# Patient Record
Sex: MALE | Race: BLACK OR AFRICAN AMERICAN | NOT HISPANIC OR LATINO | URBAN - METROPOLITAN AREA
[De-identification: names, ages, dates, MRNs, and addresses within clinical notes are randomized per-mention and may not be internally consistent; named-entity substitution may affect disease eponyms.]

---

## 2021-04-12 ENCOUNTER — INPATIENT (INPATIENT)
Facility: HOSPITAL | Age: 36
LOS: 1 days | Discharge: HOME | DRG: 418 | End: 2021-04-14
Attending: SURGERY | Admitting: SURGERY
Payer: COMMERCIAL

## 2021-04-12 ENCOUNTER — TRANSCRIPTION ENCOUNTER (OUTPATIENT)
Age: 36
End: 2021-04-12

## 2021-04-12 VITALS
HEART RATE: 78 BPM | SYSTOLIC BLOOD PRESSURE: 151 MMHG | DIASTOLIC BLOOD PRESSURE: 83 MMHG | HEIGHT: 72 IN | OXYGEN SATURATION: 99 % | TEMPERATURE: 98 F | WEIGHT: 315 LBS | RESPIRATION RATE: 18 BRPM

## 2021-04-12 LAB
ALBUMIN SERPL ELPH-MCNC: 3.4 G/DL — SIGNIFICANT CHANGE UP (ref 3.3–5)
ALP SERPL-CCNC: 78 U/L — SIGNIFICANT CHANGE UP (ref 40–120)
ALT FLD-CCNC: 44 U/L — SIGNIFICANT CHANGE UP (ref 12–78)
ANION GAP SERPL CALC-SCNC: 8 MMOL/L — SIGNIFICANT CHANGE UP (ref 5–17)
AST SERPL-CCNC: 21 U/L — SIGNIFICANT CHANGE UP (ref 15–37)
BASOPHILS # BLD AUTO: 0.03 K/UL — SIGNIFICANT CHANGE UP (ref 0–0.2)
BASOPHILS NFR BLD AUTO: 0.4 % — SIGNIFICANT CHANGE UP (ref 0–2)
BILIRUB SERPL-MCNC: 0.4 MG/DL — SIGNIFICANT CHANGE UP (ref 0.2–1.2)
BUN SERPL-MCNC: 13 MG/DL — SIGNIFICANT CHANGE UP (ref 7–23)
CALCIUM SERPL-MCNC: 8.7 MG/DL — SIGNIFICANT CHANGE UP (ref 8.5–10.1)
CHLORIDE SERPL-SCNC: 105 MMOL/L — SIGNIFICANT CHANGE UP (ref 96–108)
CO2 SERPL-SCNC: 23 MMOL/L — SIGNIFICANT CHANGE UP (ref 22–31)
CREAT SERPL-MCNC: 0.91 MG/DL — SIGNIFICANT CHANGE UP (ref 0.5–1.3)
EOSINOPHIL # BLD AUTO: 0.01 K/UL — SIGNIFICANT CHANGE UP (ref 0–0.5)
EOSINOPHIL NFR BLD AUTO: 0.1 % — SIGNIFICANT CHANGE UP (ref 0–6)
GLUCOSE SERPL-MCNC: 120 MG/DL — HIGH (ref 70–99)
HCT VFR BLD CALC: 47.5 % — SIGNIFICANT CHANGE UP (ref 39–50)
HGB BLD-MCNC: 15.3 G/DL — SIGNIFICANT CHANGE UP (ref 13–17)
IMM GRANULOCYTES NFR BLD AUTO: 0.5 % — SIGNIFICANT CHANGE UP (ref 0–1.5)
LIDOCAIN IGE QN: 71 U/L — LOW (ref 73–393)
LYMPHOCYTES # BLD AUTO: 1.09 K/UL — SIGNIFICANT CHANGE UP (ref 1–3.3)
LYMPHOCYTES # BLD AUTO: 14.2 % — SIGNIFICANT CHANGE UP (ref 13–44)
MCHC RBC-ENTMCNC: 27.1 PG — SIGNIFICANT CHANGE UP (ref 27–34)
MCHC RBC-ENTMCNC: 32.2 GM/DL — SIGNIFICANT CHANGE UP (ref 32–36)
MCV RBC AUTO: 84.1 FL — SIGNIFICANT CHANGE UP (ref 80–100)
MONOCYTES # BLD AUTO: 0.44 K/UL — SIGNIFICANT CHANGE UP (ref 0–0.9)
MONOCYTES NFR BLD AUTO: 5.7 % — SIGNIFICANT CHANGE UP (ref 2–14)
NEUTROPHILS # BLD AUTO: 6.07 K/UL — SIGNIFICANT CHANGE UP (ref 1.8–7.4)
NEUTROPHILS NFR BLD AUTO: 79.1 % — HIGH (ref 43–77)
NRBC # BLD: 0 /100 WBCS — SIGNIFICANT CHANGE UP (ref 0–0)
PLATELET # BLD AUTO: 284 K/UL — SIGNIFICANT CHANGE UP (ref 150–400)
POTASSIUM SERPL-MCNC: 4.4 MMOL/L — SIGNIFICANT CHANGE UP (ref 3.5–5.3)
POTASSIUM SERPL-SCNC: 4.4 MMOL/L — SIGNIFICANT CHANGE UP (ref 3.5–5.3)
PROT SERPL-MCNC: 8.5 G/DL — HIGH (ref 6–8.3)
RBC # BLD: 5.65 M/UL — SIGNIFICANT CHANGE UP (ref 4.2–5.8)
RBC # FLD: 13.9 % — SIGNIFICANT CHANGE UP (ref 10.3–14.5)
SODIUM SERPL-SCNC: 136 MMOL/L — SIGNIFICANT CHANGE UP (ref 135–145)
WBC # BLD: 7.68 K/UL — SIGNIFICANT CHANGE UP (ref 3.8–10.5)
WBC # FLD AUTO: 7.68 K/UL — SIGNIFICANT CHANGE UP (ref 3.8–10.5)

## 2021-04-12 PROCEDURE — 99285 EMERGENCY DEPT VISIT HI MDM: CPT

## 2021-04-12 PROCEDURE — 93010 ELECTROCARDIOGRAM REPORT: CPT

## 2021-04-12 PROCEDURE — 76700 US EXAM ABDOM COMPLETE: CPT | Mod: 26

## 2021-04-12 RX ORDER — ONDANSETRON 8 MG/1
4 TABLET, FILM COATED ORAL ONCE
Refills: 0 | Status: COMPLETED | OUTPATIENT
Start: 2021-04-12 | End: 2021-04-12

## 2021-04-12 RX ORDER — LIDOCAINE 4 G/100G
10 CREAM TOPICAL ONCE
Refills: 0 | Status: COMPLETED | OUTPATIENT
Start: 2021-04-12 | End: 2021-04-12

## 2021-04-12 RX ORDER — MORPHINE SULFATE 50 MG/1
4 CAPSULE, EXTENDED RELEASE ORAL ONCE
Refills: 0 | Status: DISCONTINUED | OUTPATIENT
Start: 2021-04-12 | End: 2021-04-12

## 2021-04-12 RX ORDER — PANTOPRAZOLE SODIUM 20 MG/1
40 TABLET, DELAYED RELEASE ORAL ONCE
Refills: 0 | Status: COMPLETED | OUTPATIENT
Start: 2021-04-12 | End: 2021-04-12

## 2021-04-12 RX ORDER — SODIUM CHLORIDE 9 MG/ML
1000 INJECTION INTRAMUSCULAR; INTRAVENOUS; SUBCUTANEOUS ONCE
Refills: 0 | Status: COMPLETED | OUTPATIENT
Start: 2021-04-12 | End: 2021-04-12

## 2021-04-12 RX ADMIN — MORPHINE SULFATE 4 MILLIGRAM(S): 50 CAPSULE, EXTENDED RELEASE ORAL at 23:09

## 2021-04-12 RX ADMIN — SODIUM CHLORIDE 1000 MILLILITER(S): 9 INJECTION INTRAMUSCULAR; INTRAVENOUS; SUBCUTANEOUS at 22:47

## 2021-04-12 RX ADMIN — LIDOCAINE 10 MILLILITER(S): 4 CREAM TOPICAL at 22:46

## 2021-04-12 RX ADMIN — PANTOPRAZOLE SODIUM 40 MILLIGRAM(S): 20 TABLET, DELAYED RELEASE ORAL at 22:46

## 2021-04-12 RX ADMIN — Medication 30 MILLILITER(S): at 22:46

## 2021-04-12 RX ADMIN — ONDANSETRON 4 MILLIGRAM(S): 8 TABLET, FILM COATED ORAL at 22:46

## 2021-04-12 NOTE — ED PROVIDER NOTE - ATTENDING CONTRIBUTION TO CARE
36 yo male no pmhx c/o epigastric/RUQ abdominal since 1pm today, sharp, constant, nonradiating, moderate, had this type of pain in the past, +n/v.  No diarrhea.  No fever/chills. No flank pain.      Gen: Alert, NAD, obese  Head/eyes: NC/AT, PERRL  ENT: airway patent  Neck: supple, no tenderness/meningismus/JVD, Trachea midline  Pulm/lung: Bilateral clear BS, normal resp effort, no wheeze/stridor/retractions  CV/heart: RRR, no M/R/G, +2 dist pulses (radial, pedal DP/PT, popliteal)  GI/Abd: soft, +ttp epigastric and RUQ/ND, +BS, no guarding/rebound tenderness  Musculoskeletal: no edema/erythema/cyanosis, FROM in all extremities, no C/T/L spine ttp  Skin: no rash, no vesicles, no petechaie, no ecchymosis, no swelling  Neuro: AAOx3, CN 2-12 intact, normal sensation, 5/5 motor strength in all extremities, normal gait    labs, US, IV analgesia, antiemetics

## 2021-04-12 NOTE — ED PROVIDER NOTE - OBJECTIVE STATEMENT
36 yo male with no significant pmh presents to ED c/o epigastric/RUQ abd pain since 1 pm this afternoon. Pain is sharp, constant, nonradiating. Patient admits to previous similar pain however pain resolved spontaneously and was never evaluated for it. Associated with nausea and vomiting. Denies fever, chills, chest pain, diarrhea, urinary sxs, flank pain. no previous abd surgery.

## 2021-04-12 NOTE — ED ADULT TRIAGE NOTE - CHIEF COMPLAINT QUOTE
35 yr old male c/o epigastric abdominal pain  with NV since this afternoon. Took tylenol and hydrocodone without relief.

## 2021-04-12 NOTE — ED PROVIDER NOTE - PROGRESS NOTE DETAILS
discussed case with [facility] surgical PA, recommend hold in ED until AM for HIDA, Dr. Chaudhary will see in AM Dr. Chaudhary will admit

## 2021-04-12 NOTE — ED PROVIDER NOTE - CLINICAL SUMMARY MEDICAL DECISION MAKING FREE TEXT BOX
36 yo male with no significant pmh presents to ED c/o epigastric/RUQ abd pain since 1 pm this afternoon. Pain is sharp, constant, nonradiating. Patient admits to previous similar pain however pain resolved spontaneously and was never evaluated for it. Associated with nausea and vomiting.  PE: + epigastric/RUQ tendenress. A/P: labs, RUQ US, meds, reassess.

## 2021-04-13 ENCOUNTER — RESULT REVIEW (OUTPATIENT)
Age: 36
End: 2021-04-13

## 2021-04-13 ENCOUNTER — TRANSCRIPTION ENCOUNTER (OUTPATIENT)
Age: 36
End: 2021-04-13

## 2021-04-13 DIAGNOSIS — Z01.818 ENCOUNTER FOR OTHER PREPROCEDURAL EXAMINATION: ICD-10-CM

## 2021-04-13 DIAGNOSIS — K80.50 CALCULUS OF BILE DUCT WITHOUT CHOLANGITIS OR CHOLECYSTITIS WITHOUT OBSTRUCTION: ICD-10-CM

## 2021-04-13 DIAGNOSIS — E66.01 MORBID (SEVERE) OBESITY DUE TO EXCESS CALORIES: ICD-10-CM

## 2021-04-13 DIAGNOSIS — Z29.9 ENCOUNTER FOR PROPHYLACTIC MEASURES, UNSPECIFIED: ICD-10-CM

## 2021-04-13 DIAGNOSIS — K80.20 CALCULUS OF GALLBLADDER WITHOUT CHOLECYSTITIS WITHOUT OBSTRUCTION: ICD-10-CM

## 2021-04-13 DIAGNOSIS — Z72.0 TOBACCO USE: ICD-10-CM

## 2021-04-13 LAB
APTT BLD: 33.2 SEC — SIGNIFICANT CHANGE UP (ref 27.5–35.5)
BASE EXCESS BLDV CALC-SCNC: 1.8 MMOL/L — SIGNIFICANT CHANGE UP (ref -2–2)
BLOOD GAS COMMENTS, VENOUS: SIGNIFICANT CHANGE UP
HCO3 BLDV-SCNC: 26 MMOL/L — SIGNIFICANT CHANGE UP (ref 21–29)
HOROWITZ INDEX BLDV+IHG-RTO: 21 — SIGNIFICANT CHANGE UP
INR BLD: 1.18 RATIO — HIGH (ref 0.88–1.16)
PCO2 BLDV: 42 MMHG — SIGNIFICANT CHANGE UP (ref 42–55)
PH BLDV: 7.41 — SIGNIFICANT CHANGE UP (ref 7.35–7.45)
PO2 BLDV: 87 MMHG — HIGH (ref 25–45)
PROTHROM AB SERPL-ACNC: 13.7 SEC — HIGH (ref 10.6–13.6)
SAO2 % BLDV: 97 % — HIGH (ref 67–88)
SARS-COV-2 RNA SPEC QL NAA+PROBE: SIGNIFICANT CHANGE UP
TSH SERPL-MCNC: 0.54 UIU/ML — SIGNIFICANT CHANGE UP (ref 0.36–3.74)

## 2021-04-13 PROCEDURE — 47562 LAPAROSCOPIC CHOLECYSTECTOMY: CPT | Mod: AS

## 2021-04-13 PROCEDURE — 86077 PHYS BLOOD BANK SERV XMATCH: CPT

## 2021-04-13 PROCEDURE — 47562 LAPAROSCOPIC CHOLECYSTECTOMY: CPT

## 2021-04-13 PROCEDURE — 88304 TISSUE EXAM BY PATHOLOGIST: CPT | Mod: 26

## 2021-04-13 PROCEDURE — 99223 1ST HOSP IP/OBS HIGH 75: CPT

## 2021-04-13 PROCEDURE — 99223 1ST HOSP IP/OBS HIGH 75: CPT | Mod: 57

## 2021-04-13 RX ORDER — SODIUM CHLORIDE 9 MG/ML
1000 INJECTION, SOLUTION INTRAVENOUS
Refills: 0 | Status: DISCONTINUED | OUTPATIENT
Start: 2021-04-13 | End: 2021-04-14

## 2021-04-13 RX ORDER — CEFOTETAN DISODIUM 1 G
2 VIAL (EA) INJECTION ONCE
Refills: 0 | Status: COMPLETED | OUTPATIENT
Start: 2021-04-13 | End: 2021-04-13

## 2021-04-13 RX ORDER — OXYCODONE HYDROCHLORIDE 5 MG/1
10 TABLET ORAL EVERY 6 HOURS
Refills: 0 | Status: DISCONTINUED | OUTPATIENT
Start: 2021-04-13 | End: 2021-04-14

## 2021-04-13 RX ORDER — HYDROMORPHONE HYDROCHLORIDE 2 MG/ML
0.5 INJECTION INTRAMUSCULAR; INTRAVENOUS; SUBCUTANEOUS ONCE
Refills: 0 | Status: DISCONTINUED | OUTPATIENT
Start: 2021-04-13 | End: 2021-04-13

## 2021-04-13 RX ORDER — ACETAMINOPHEN 500 MG
650 TABLET ORAL EVERY 6 HOURS
Refills: 0 | Status: DISCONTINUED | OUTPATIENT
Start: 2021-04-14 | End: 2021-04-14

## 2021-04-13 RX ORDER — ACETAMINOPHEN 500 MG
650 TABLET ORAL EVERY 6 HOURS
Refills: 0 | Status: DISCONTINUED | OUTPATIENT
Start: 2021-04-13 | End: 2021-04-13

## 2021-04-13 RX ORDER — ONDANSETRON 8 MG/1
4 TABLET, FILM COATED ORAL ONCE
Refills: 0 | Status: DISCONTINUED | OUTPATIENT
Start: 2021-04-13 | End: 2021-04-13

## 2021-04-13 RX ORDER — ONDANSETRON 8 MG/1
4 TABLET, FILM COATED ORAL EVERY 6 HOURS
Refills: 0 | Status: DISCONTINUED | OUTPATIENT
Start: 2021-04-13 | End: 2021-04-14

## 2021-04-13 RX ORDER — HYDROMORPHONE HYDROCHLORIDE 2 MG/ML
1 INJECTION INTRAMUSCULAR; INTRAVENOUS; SUBCUTANEOUS
Refills: 0 | Status: DISCONTINUED | OUTPATIENT
Start: 2021-04-13 | End: 2021-04-13

## 2021-04-13 RX ORDER — HEPARIN SODIUM 5000 [USP'U]/ML
5000 INJECTION INTRAVENOUS; SUBCUTANEOUS EVERY 8 HOURS
Refills: 0 | Status: DISCONTINUED | OUTPATIENT
Start: 2021-04-13 | End: 2021-04-14

## 2021-04-13 RX ORDER — HYDROMORPHONE HYDROCHLORIDE 2 MG/ML
0.5 INJECTION INTRAMUSCULAR; INTRAVENOUS; SUBCUTANEOUS
Refills: 0 | Status: DISCONTINUED | OUTPATIENT
Start: 2021-04-13 | End: 2021-04-13

## 2021-04-13 RX ORDER — PIPERACILLIN AND TAZOBACTAM 4; .5 G/20ML; G/20ML
3.38 INJECTION, POWDER, LYOPHILIZED, FOR SOLUTION INTRAVENOUS ONCE
Refills: 0 | Status: COMPLETED | OUTPATIENT
Start: 2021-04-13 | End: 2021-04-13

## 2021-04-13 RX ORDER — SODIUM CHLORIDE 9 MG/ML
1000 INJECTION, SOLUTION INTRAVENOUS
Refills: 0 | Status: DISCONTINUED | OUTPATIENT
Start: 2021-04-13 | End: 2021-04-13

## 2021-04-13 RX ORDER — BENZOCAINE AND MENTHOL 5; 1 G/100ML; G/100ML
1 LIQUID ORAL
Refills: 0 | Status: DISCONTINUED | OUTPATIENT
Start: 2021-04-13 | End: 2021-04-14

## 2021-04-13 RX ORDER — PIPERACILLIN AND TAZOBACTAM 4; .5 G/20ML; G/20ML
3.38 INJECTION, POWDER, LYOPHILIZED, FOR SOLUTION INTRAVENOUS EVERY 8 HOURS
Refills: 0 | Status: DISCONTINUED | OUTPATIENT
Start: 2021-04-13 | End: 2021-04-13

## 2021-04-13 RX ORDER — NICOTINE POLACRILEX 2 MG
1 GUM BUCCAL DAILY
Refills: 0 | Status: DISCONTINUED | OUTPATIENT
Start: 2021-04-13 | End: 2021-04-13

## 2021-04-13 RX ORDER — ACETAMINOPHEN 500 MG
1000 TABLET ORAL ONCE
Refills: 0 | Status: COMPLETED | OUTPATIENT
Start: 2021-04-13 | End: 2021-04-13

## 2021-04-13 RX ORDER — HEPARIN SODIUM 5000 [USP'U]/ML
5000 INJECTION INTRAVENOUS; SUBCUTANEOUS EVERY 8 HOURS
Refills: 0 | Status: DISCONTINUED | OUTPATIENT
Start: 2021-04-13 | End: 2021-04-13

## 2021-04-13 RX ORDER — OXYCODONE HYDROCHLORIDE 5 MG/1
5 TABLET ORAL EVERY 6 HOURS
Refills: 0 | Status: DISCONTINUED | OUTPATIENT
Start: 2021-04-13 | End: 2021-04-14

## 2021-04-13 RX ADMIN — Medication 400 MILLIGRAM(S): at 22:08

## 2021-04-13 RX ADMIN — OXYCODONE HYDROCHLORIDE 10 MILLIGRAM(S): 5 TABLET ORAL at 19:26

## 2021-04-13 RX ADMIN — HYDROMORPHONE HYDROCHLORIDE 0.5 MILLIGRAM(S): 2 INJECTION INTRAMUSCULAR; INTRAVENOUS; SUBCUTANEOUS at 05:14

## 2021-04-13 RX ADMIN — SODIUM CHLORIDE 100 MILLILITER(S): 9 INJECTION, SOLUTION INTRAVENOUS at 16:54

## 2021-04-13 RX ADMIN — HYDROMORPHONE HYDROCHLORIDE 0.5 MILLIGRAM(S): 2 INJECTION INTRAMUSCULAR; INTRAVENOUS; SUBCUTANEOUS at 02:12

## 2021-04-13 RX ADMIN — HYDROMORPHONE HYDROCHLORIDE 0.5 MILLIGRAM(S): 2 INJECTION INTRAMUSCULAR; INTRAVENOUS; SUBCUTANEOUS at 05:20

## 2021-04-13 RX ADMIN — SODIUM CHLORIDE 75 MILLILITER(S): 9 INJECTION, SOLUTION INTRAVENOUS at 07:35

## 2021-04-13 RX ADMIN — HYDROMORPHONE HYDROCHLORIDE 0.5 MILLIGRAM(S): 2 INJECTION INTRAMUSCULAR; INTRAVENOUS; SUBCUTANEOUS at 02:25

## 2021-04-13 RX ADMIN — MORPHINE SULFATE 4 MILLIGRAM(S): 50 CAPSULE, EXTENDED RELEASE ORAL at 05:14

## 2021-04-13 RX ADMIN — HEPARIN SODIUM 5000 UNIT(S): 5000 INJECTION INTRAVENOUS; SUBCUTANEOUS at 22:08

## 2021-04-13 RX ADMIN — Medication 1000 MILLIGRAM(S): at 22:38

## 2021-04-13 RX ADMIN — HYDROMORPHONE HYDROCHLORIDE 0.5 MILLIGRAM(S): 2 INJECTION INTRAMUSCULAR; INTRAVENOUS; SUBCUTANEOUS at 07:34

## 2021-04-13 RX ADMIN — OXYCODONE HYDROCHLORIDE 10 MILLIGRAM(S): 5 TABLET ORAL at 22:35

## 2021-04-13 RX ADMIN — HYDROMORPHONE HYDROCHLORIDE 0.5 MILLIGRAM(S): 2 INJECTION INTRAMUSCULAR; INTRAVENOUS; SUBCUTANEOUS at 00:35

## 2021-04-13 RX ADMIN — PIPERACILLIN AND TAZOBACTAM 200 GRAM(S): 4; .5 INJECTION, POWDER, LYOPHILIZED, FOR SOLUTION INTRAVENOUS at 07:34

## 2021-04-13 NOTE — CONSULT NOTE ADULT - TIME BILLING
Discussed concerns for GENIA given weight and risks. Counseled on weight loss, tobacco cessation and EtOH cessation. Discussed with overnight surgery PA Sammy and incoming surgical PA. Discussed with pulmonary consultant Dr. Serrano.

## 2021-04-13 NOTE — DISCHARGE NOTE PROVIDER - PROVIDER TOKENS
PROVIDER:[TOKEN:[[id number]]] PROVIDER:[TOKEN:[[id number]]],PROVIDER:[TOKEN:[[id number]]]

## 2021-04-13 NOTE — H&P ADULT - HISTORY OF PRESENT ILLNESS
36 y/o M pmhx of obesity, presents to [place] ED c/o upper abdominal since 1 pm yesterday. PT states he was in USOH prior to onset of symptoms with associated nausea. PT states he ate some fried food which may have precipitated symptoms. PT states the pain has localized to her RUQ, denying and radiation. PT admits to an episode of similar symptoms in the past which resolved on their own, and he did not receive any medical work up for. At this time pt c/o continued RUQ abdominal pain. PT denies chest pain, SOB, palpitations, fevers, chills, melena, hematochezia, recent travel or sick contacts.

## 2021-04-13 NOTE — CONSULT NOTE ADULT - ASSESSMENT
34 y/o M pmhx obesity, presenting to [facility] ED with upper abdominal pain localized to RUQ with cbc abd cmp wnl, and GB US consistent with cholelithiasis, with persistent RUQ abdominal pain,   planned for HIDA scan  eval for acute maicol in progress  on emp ABX  on DVT p  pain rx regimen - would avoid Opioids if possible  GENIA eval - snoring - phenotype - sx - emp settings CPAP - follow up as outpatient  smoking cess ed and counseling - NRT -   no objection for OR - will need prolonged monitoring in PACU if going for OR - as pt is likely to have undiagnosed GENIA

## 2021-04-13 NOTE — DISCHARGE NOTE PROVIDER - NSDCFUADDINST_GEN_ALL_CORE_FT
NO heavy lifting, strenuous activity  No reaching, pulling, pushing, lifting >20 lbs.  NO heavy lifting, strenuous activity  No reaching, pulling, pushing, lifting >20 lbs.   Leave steri strips intact- If falls off that is ok.  May shower in 24 hours.  Let soapy water run over abdomen.  Pat dry.  NO heavy lifting, strenuous activity  No reaching, pulling, pushing, lifting >20 lbs.   Leave steri strips intact- If falls off that is ok.  May shower in 24 hours.  Let soapy water run over abdomen.  Pat dry.   Continue deep breathing exercises/incentive spirometry  Regular walking.

## 2021-04-13 NOTE — H&P ADULT - NSHPLABSRESULTS_GEN_ALL_CORE
Labs:                        15.3   7.68  )-----------( 284      ( 12 Apr 2021 23:12 )             47.5   04-12    136  |  105  |  13  ----------------------------<  120<H>  4.4   |  23  |  0.91    Ca    8.7      12 Apr 2021 23:12    TPro  8.5<H>  /  Alb  3.4  /  TBili  0.4  /  DBili  x   /  AST  21  /  ALT  44  /  AlkPhos  78  04-12      Imaging:  < from: US Abdomen Complete (04.12.21 @ 23:57) >      EXAM:  US ABDOMEN COMPLETE                            PROCEDURE DATE:  04/12/2021          INTERPRETATION:  CLINICAL INFORMATION: Right upper quadrant pain    COMPARISON: None available.    TECHNIQUE: Sonography of the abdomen.    FINDINGS:    Liver: Hepatic steatosis. Mild hepatomegaly, 19 cm  Bile ducts: Normal caliber. Common bile duct measures 5 mm.  Gallbladder: Multiple small shadowing gallstones in the fundus. No gallbladder wall thickening or pericholecystic fluid. Negative sonographic Mora's sign, though the patient was medicated.  Pancreas: Visualized portions are within normal limits.  Spleen: 12.2 cm. Within normal limits.  Right kidney: 10.6 cm. No hydronephrosis.  Left kidney: 12.3 cm.  No hydronephrosis.  Ascites: None.  Aorta and IVC: Visualized portions are within normal limits.    IMPRESSION:  Cholelithiasis. No sonographic evidence of acute cholecystitis.    JEWEL MCKINLEY MD; Attending Radiologist  This document has been electronically signed. Apr 13 2021 12:40AM    < end of copied text >

## 2021-04-13 NOTE — CONSULT NOTE ADULT - SUBJECTIVE AND OBJECTIVE BOX
Date/Time Patient Seen:  		  Referring MD:   Data Reviewed	       Patient is a 35y old  Male who presents with a chief complaint of abd pain (13 Apr 2021 06:11)      Subjective/HPI  in bed  seen and examined  vs and meds reviewed  labs reviewed  h and p reviewed  er provider note reviewed  smoker  lives alone  works a supervisor in the lab  family hx - obesity     History of Present Illness:  Reason for Admission: abd pain  History of Present Illness:   34 y/o M pmhx of obesity, presents to [facility] ED c/o upper abdominal since 1 pm yesterday. PT states he was in USOH prior to onset of symptoms with associated nausea. PT states he ate some fried food which may have precipitated symptoms. PT states the pain has localized to her RUQ, denying and radiation. PT admits to an episode of similar symptoms in the past which resolved on their own, and he did not receive any medical work up for. At this time pt c/o continued RUQ abdominal pain. PT denies chest pain, SOB, palpitations, fevers, chills, melena, hematochezia, recent travel or sick contacts.      Social History:  Social History (marital status, living situation, occupation, tobacco use, alcohol and drug use, and sexual history): Occasional tobacco smoker     Tobacco Screening:  · Core Measure Site	Yes  · Has the patient used tobacco in the past 30 days?	No    Risk Assessment:    Present on Admission:  Deep Venous Thrombosis	no  Pulmonary Embolus	no     Heart Failure:  Does this patient have a history of or has been diagnosed with heart failure? no.       PAST MEDICAL & SURGICAL HISTORY:  No pertinent past medical history    No significant past surgical history          Medication list         MEDICATIONS  (STANDING):    MEDICATIONS  (PRN):         Vitals log        ICU Vital Signs Last 24 Hrs  T(C): 36.7 (13 Apr 2021 04:22), Max: 36.7 (12 Apr 2021 21:53)  T(F): 98 (13 Apr 2021 04:22), Max: 98 (12 Apr 2021 21:53)  HR: 83 (13 Apr 2021 04:22) (72 - 83)  BP: 139/84 (13 Apr 2021 04:22) (138/82 - 151/83)  BP(mean): --  ABP: --  ABP(mean): --  RR: 16 (13 Apr 2021 04:22) (15 - 18)  SpO2: 99% (13 Apr 2021 04:22) (97% - 99%)           Input and Output:  I&O's Detail      Lab Data                        15.3   7.68  )-----------( 284      ( 12 Apr 2021 23:12 )             47.5     04-12    136  |  105  |  13  ----------------------------<  120<H>  4.4   |  23  |  0.91    Ca    8.7      12 Apr 2021 23:12    TPro  8.5<H>  /  Alb  3.4  /  TBili  0.4  /  DBili  x   /  AST  21  /  ALT  44  /  AlkPhos  78  04-12            Review of Systems	    abd pain  anxious    Objective     Physical Examination  obese  head nc  tattoos  heart s1s2  lung dec BS  head at  verbal  cn grossly int        Pertinent Lab findings & Imaging      Gaviria:  NO   Adequate UO     I&O's Detail           Discussed with:     Cultures:	        Radiology      EXAM:  US ABDOMEN COMPLETE                            PROCEDURE DATE:  04/12/2021          INTERPRETATION:  CLINICAL INFORMATION: Right upper quadrant pain    COMPARISON: None available.    TECHNIQUE: Sonography of the abdomen.    FINDINGS:    Liver: Hepatic steatosis. Mild hepatomegaly, 19 cm  Bile ducts: Normal caliber. Common bile duct measures 5 mm.  Gallbladder: Multiple small shadowing gallstones in the fundus. No gallbladder wall thickening or pericholecystic fluid. Negative sonographic Mora's sign, though the patient was medicated.  Pancreas: Visualized portions are within normal limits.  Spleen: 12.2 cm. Within normal limits.  Right kidney: 10.6 cm. No hydronephrosis.  Left kidney: 12.3 cm.  No hydronephrosis.  Ascites: None.  Aorta and IVC: Visualized portions are within normal limits.    IMPRESSION:  Cholelithiasis. No sonographic evidence of acute cholecystitis.              JEWEL MCKINLEY MD; Attending Radiologist  This document has been electronically signed. Apr 13 2021 12:40AM

## 2021-04-13 NOTE — DISCHARGE NOTE PROVIDER - CARE PROVIDERS DIRECT ADDRESSES
[email or web address]

## 2021-04-13 NOTE — ED ADULT NURSE REASSESSMENT NOTE - NS ED NURSE REASSESS COMMENT FT1
pt aox4, 4/10 upper q abd pain, no n/v, lungs clear, abd soft, + sounds, NPO, IVL patent, awaiting for Med Bed

## 2021-04-13 NOTE — DISCHARGE NOTE PROVIDER - CARE PROVIDER_API CALL
Keo Chaudhary)  Surgery  [address] [place] [address], [address]  [place] [address]  Phone: [phone]  Fax: [phone]  Follow Up Time:    Keo Chaudhary)  Surgery  [address] [place] [address], [address]  [place] [address]  Phone: [phone]  Fax: [phone]  Follow Up Time:     Rajendra Serrano)  Critical Care Medicine; HospicePalliative Medicine; Internal Medicine; Pulmonary Disease  [address] [place] [address]  Phone: [phone]  Fax: [phone]  Follow Up Time:

## 2021-04-13 NOTE — H&P ADULT - ATTENDING COMMENTS
Patient seen and examined.  He reports persistent right upper quadrant abdominal pain and tenderness.  I explained to Mr. Antony that is unclear whether his symptoms are due to a recurrent attack of biliary colic versus a developing acute cholecystitis.  Nevertheless, I have recommended a cholecystectomy in light of his history of recurrent attacks and that he is persistently symptomatic.  Medicine consult noted and appreciated.  The risks of surgery, including but not limited to, bleeding, infection, damage to surrounding structures (including bile duct injury), conversion to open surgery, and hernia formation, were discussed with the patient who understands these risks and consents to the planned surgery.  All questions were answered.

## 2021-04-13 NOTE — CONSULT NOTE ADULT - SUBJECTIVE AND OBJECTIVE BOX
Patient is a 35y old  Male who presents with a chief complaint of abd pain (2021 05:44)    HPI:  36 y/o M pmhx of obesity, presents to [facility] ED c/o upper abdominal since 1 pm yesterday. PT states he was in [place] prior to onset of symptoms with associated nausea. PT states he ate some fried food which may have precipitated symptoms. PT states the pain has localized to her RUQ, denying and radiation. PT admits to an episode of similar symptoms in the past which resolved on their own, and he did not receive any medical work up for. At this time pt c/o continued RUQ abdominal pain. PT denies chest pain, SOB, palpitations, fevers, chills, melena, hematochezia, recent travel or sick contacts.  (2021 05:44)    Interval HPI: 36 y/o M with PMHx of obesity, kidney stone presented to the ED complaining of RUQ pain with associated nausea since approximately 130PM on 21. He states that he had a lot of fried food the night/morning before. States that this has happened in the past but has always resolved spontaneously. He is nervous as he has never has surgery before. He works at night for [employer]. He admits to snoring and possibly apneic events, along with sleepiness after night of sleeping (attributes to his bed). He admits to non-radiating 5/10 RUQ pain, improved with IV analgesia. He states that he has no history of hypertension, nor any other cardiac history. States that he is active, walks a lot since he doesn't have a car at the moment. Able to walk >1 mile without any chest pain or dyspnea. Denies chest pain, palpitations, dyspnea, headache, dizziness, v/d. He reports no orthopnea, dyspnea on exertion or paroxysmal nocturnal dyspnea.    PRE-OP: exercise tolerance >4.0 mets [ x ] YES   [  ] NO  ; active tobacco use [ x ] YES   [  ] NO  ;  known h/o GENIA [  ] YES   [ x ] NO  ; presence of obesity  [ x ] YES   [  ] NO  ;  presence of alcohol misuse [ x ] YES   [  ] NO  ; presence of illicit drug use [x  ] YES   [  ] NO  ; personal history of complications from anesthesia [  ] YES   [ x ] NO    1.  SNORING:  Does patient snore loudly (loud enough to be heard through closed doors or their bed-partner elbows you for snoring at night)?  [ x ] YES        [  ]NO    2.  TIRED: Does patient often feel tired, fatigues, or sleepy during the daytime (such as falling asleep during driving or talking to someone)?  [ x ] YES        [  ]NO    3.  OBSERVED:  Has anyone observed patient stop breathing or choke/gasp during sleep?  [ x ] YES        [ ]NO    4.  PRESSURE:  Does patient have high blood pressure?  [  ] YES        [x ]NO    5.  Patients BMI:  Height (cm): 182.9 (21 @ 21:53)  Weight (kg): 170.1 (21 @ 21:53)  BMI (kg/m2): 50.8 (21 @ 21:53)    Is the patient's BMI greater than 35?  [ x ] YES        [  ]NO    6.  Patients age:  35y    Is the patient's age greater than 50?  [  ] YES        [ ]NO    7.  NECK SIZE (measured around [place]):  MALES: is patient's shirt collar size 17 inches (43CM) or greater?  [ x ] YES        [ ]NO    8.  IS THE PATIENT'S GENDER MALE?  [ x ] YES        [ ]NO    SCORIN point for each yes answer       Low Risk GENIA:                          score 0, 1 or 2       Intermediate Risk GENIA:          score 3 or 4       High Risk GENIA:                         score 5, 6, 7, or 8                                                 OR  a score of 2 or greater and MALE gender                                                 OR a score of 2 or greater and BMI greater than 35                                                 OR a score of 2 or greater and YES answer to NECK size question        I&O's Summary      PAST MEDICAL & SURGICAL HISTORY:  History of kidney stone  History of obesity    No significant past surgical history        Allergies    No Known Allergies    Intolerances        SOCIAL HISTORY  Alcohol: admits to binge drinking only on weekends 10-15 drinks (mixed cocktails) over 2 days, no drinks throughout the week  Tobacco: admits tobacco use - 0.5pack/week for past 5 years  Illicit substance use: admits THC use    FAMILY HISTORY:  Family history of DM2 in mother    Home Medications:  none      LABS:                        15.3   7.68  )-----------( 284      ( 2021 23:12 )             47.5     -12    136  |  105  |  13  ----------------------------<  120<H>  4.4   |  23  |  0.91    Ca    8.7      2021 23:12    TPro  8.5<H>  /  Alb  3.4  /  TBili  0.4  /  DBili  x   /  AST  21  /  ALT  44  /  AlkPhos  78  04-12      CAPILLARY BLOOD GLUCOSE      MEDICATIONS  (STANDING):    MEDICATIONS  (PRN):      REVIEW OF SYSTEMS:  CONSTITUTIONAL: denies fever, chills, fatigue, weakness  HEENT: denies blurred vision, sore throat  SKIN: denies new lesions, rash  CARDIOVASCULAR: denies chest pain, chest pressure, palpitations  RESPIRATORY: denies shortness of breath, sputum production  GASTROINTESTINAL: denies vomiting, diarrhea; admits abdominal pain and nausea  GENITOURINARY: denies dysuria, discharge  NEUROLOGICAL: denies numbness, headache, focal weakness  MUSCULOSKELETAL: denies new joint pain, muscle aches  HEMATOLOGIC: denies gross bleeding, bruising  LYMPHATICS: denies enlarged lymph nodes, extremity swelling  PSYCHIATRIC: denies recent changes in anxiety, depression  ENDOCRINOLOGIC: denies sweating, cold or heat intolerance    RADIOLOGY & ADDITIONAL TESTS:    < from: US Abdomen Complete (21 @ 23:57) >    EXAM:  US ABDOMEN COMPLETE                            PROCEDURE DATE:  2021          INTERPRETATION:  CLINICAL INFORMATION: Right upper quadrant pain    COMPARISON: None available.    TECHNIQUE: Sonography of the abdomen.    FINDINGS:    Liver: Hepatic steatosis. Mild hepatomegaly, 19 cm  Bile ducts: Normal caliber. Common bile duct measures 5 mm.  Gallbladder: Multiple small shadowing gallstones in the fundus. No gallbladder wall thickening or pericholecystic fluid. Negative sonographic Mora's sign, though the patient was medicated.  Pancreas: Visualized portions are within normal limits.  Spleen: 12.2 cm. Within normal limits.  Right kidney: 10.6 cm. No hydronephrosis.  Left kidney: 12.3 cm.  No hydronephrosis.  Ascites: None.  Aorta and IVC: Visualized portions are within normal limits.    IMPRESSION:  Cholelithiasis. No sonographic evidence of acute cholecystitis.      JEWEL MCKINLEY MD; Attending Radiologist  This document has been electronically signed. 2021 12:40AM    < end of copied text >      EKG: NSR at 74bpm no acute ischemic changes (personally reviewed)    Imaging Personally Reviewed:  [x] YES  [ ] NO    Consultant(s) Notes Reviewed:  [x] YES  [ ] NO      PHYSICAL EXAM:  T(F): 98 (21 @ 04:22), Max: 98 (21 @ 21:53)  HR: 83 (21 @ 04:22) (72 - 83)  BP: 139/84 (21 @ 04:22) (138/82 - 151/83)  RR: 16 (21 @ 04:22) (15 - 18)  SpO2: 99% (21 @ 04:22) (97% - 99%)    GENERAL: obese male in NAD  HEAD:  Atraumatic, Normocephalic  EYES: EOMI, PERRL, conjunctiva and sclera clear  ENMT: No tonsillar erythema, exudates, or enlargement; Moist mucous membranes  NECK: Supple, No JVD, Normal thyroid  NERVOUS SYSTEM:  Alert & Oriented X3, Moves all extremities, Sensation to light touch intact  CHEST/LUNG: Clear to auscultation bilaterally; No wheezes, rales or rhonchi  HEART: RRR, S1, S2; No murmurs, rubs, or gallops  ABDOMEN: Soft, Obese, tender to RUQ, Nondistended; Bowel sounds present  EXTREMITIES:  2+ Peripheral Pulses, No clubbing, cyanosis, or edema  LYMPH: No lymphadenopathy noted  SKIN: No rashes or lesions; +tattoos    Care Discussed with Consultants/Other Providers [x] YES  [ ] NO Patient is a 35y old  Male who presents with a chief complaint of abd pain (2021 05:44)    HPI:  36 y/o M pmhx of obesity, presents to [place] ED c/o upper abdominal since 1 pm yesterday. PT states he was in [place] prior to onset of symptoms with associated nausea. PT states he ate some fried food which may have precipitated symptoms. PT states the pain has localized to her RUQ, denying and radiation. PT admits to an episode of similar symptoms in the past which resolved on their own, and he did not receive any medical work up for. At this time pt c/o continued RUQ abdominal pain. PT denies chest pain, SOB, palpitations, fevers, chills, melena, hematochezia, recent travel or sick contacts.  (2021 05:44)    Interval HPI: 36 y/o M with PMHx of obesity, kidney stone presented to the ED complaining of RUQ pain with associated nausea since approximately 130PM on 21. He states that he had a lot of fried food the night/morning before. States that this has happened in the past but has always resolved spontaneously. He is nervous as he has never has surgery before. He works at night for [employer]. He admits to snoring and possibly apneic events, along with sleepiness after night of sleeping (attributes to his bed). He admits to non-radiating 5/10 RUQ pain, improved with IV analgesia. He states that he has no history of hypertension, nor any other cardiac history. States that he is active, walks a lot since he doesn't have a car at the moment. Able to walk >1 mile without any chest pain or dyspnea. Denies chest pain, palpitations, dyspnea, headache, dizziness, v/d. He reports no orthopnea, dyspnea on exertion or paroxysmal nocturnal dyspnea.    PRE-OP: exercise tolerance >4.0 mets [ x ] YES   [  ] NO  ; active tobacco use [ x ] YES   [  ] NO  ;  known h/o GENIA [  ] YES   [ x ] NO  ; presence of obesity  [ x ] YES   [  ] NO  ;  presence of alcohol misuse [ x ] YES   [  ] NO  ; presence of illicit drug use [x  ] YES   [  ] NO  ; personal history of complications from anesthesia [  ] YES   [ x ] NO    STOP-BANG    1.  SNORING:  Does patient snore loudly (loud enough to be heard through closed doors or their bed-partner elbows you for snoring at night)?  [ x ] YES        [  ]NO    2.  TIRED: Does patient often feel tired, fatigues, or sleepy during the daytime (such as falling asleep during driving or talking to someone)?  [ x ] YES        [  ]NO    3.  OBSERVED:  Has anyone observed patient stop breathing or choke/gasp during sleep?  [ x ] YES        [ ]NO    4.  PRESSURE:  Does patient have high blood pressure?  [  ] YES        [x ]NO    5.  Patients BMI:  Height (cm): 182.9 (21 @ 21:53)  Weight (kg): 170.1 (21 @ 21:53)  BMI (kg/m2): 50.8 (21 @ 21:53)    Is the patient's BMI greater than 35?  [ x ] YES        [  ]NO    6.  Patients age:  35y    Is the patient's age greater than 50?  [  ] YES        [ x ]NO    7.  NECK SIZE (measured around [place]):  MALES: is patient's shirt collar size 17 inches (43CM) or greater?  [ x ] YES        [ ]NO    8.  IS THE PATIENT'S GENDER MALE?  [ x ] YES        [ ]NO    SCORIN point for each yes answer       Low Risk GENIA:                          score 0, 1 or 2       Intermediate Risk GENIA:          score 3 or 4       High Risk GENIA:                         score 5, 6, 7, or 8                                                 OR  a score of 2 or greater and MALE gender                                                 OR a score of 2 or greater and BMI greater than 35                                                 OR a score of 2 or greater and YES answer to NECK size question        I&O's Summary      PAST MEDICAL & SURGICAL HISTORY:  History of kidney stone  History of obesity    No significant past surgical history        Allergies    No Known Allergies    Intolerances        SOCIAL HISTORY  Alcohol: admits to binge drinking only on weekends 10-15 drinks (mixed cocktails) over 2 days, no drinks throughout the week  Tobacco: admits tobacco use - 0.5pack/week for past 5 years  Illicit substance use: admits THC use    FAMILY HISTORY:  Family history of DM2 in mother    Home Medications:  none      LABS:                        15.3   7.68  )-----------( 284      ( 2021 23:12 )             47.5     04-12    136  |  105  |  13  ----------------------------<  120<H>  4.4   |  23  |  0.91    Ca    8.7      2021 23:12    TPro  8.5<H>  /  Alb  3.4  /  TBili  0.4  /  DBili  x   /  AST  21  /  ALT  44  /  AlkPhos  78        CAPILLARY BLOOD GLUCOSE      MEDICATIONS  (STANDING):    MEDICATIONS  (PRN):      REVIEW OF SYSTEMS:  CONSTITUTIONAL: denies fever, chills, fatigue, weakness  HEENT: denies blurred vision, sore throat  SKIN: denies new lesions, rash  CARDIOVASCULAR: denies chest pain, chest pressure, palpitations  RESPIRATORY: denies shortness of breath, sputum production  GASTROINTESTINAL: denies vomiting, diarrhea; admits abdominal pain and nausea  GENITOURINARY: denies dysuria, discharge  NEUROLOGICAL: denies numbness, headache, focal weakness  MUSCULOSKELETAL: denies new joint pain, muscle aches  HEMATOLOGIC: denies gross bleeding, bruising  LYMPHATICS: denies enlarged lymph nodes, extremity swelling  PSYCHIATRIC: denies recent changes in anxiety, depression  ENDOCRINOLOGIC: denies sweating, cold or heat intolerance    RADIOLOGY & ADDITIONAL TESTS:    < from: US Abdomen Complete (21 @ 23:57) >    EXAM:  US ABDOMEN COMPLETE                            PROCEDURE DATE:  2021          INTERPRETATION:  CLINICAL INFORMATION: Right upper quadrant pain    COMPARISON: None available.    TECHNIQUE: Sonography of the abdomen.    FINDINGS:    Liver: Hepatic steatosis. Mild hepatomegaly, 19 cm  Bile ducts: Normal caliber. Common bile duct measures 5 mm.  Gallbladder: Multiple small shadowing gallstones in the fundus. No gallbladder wall thickening or pericholecystic fluid. Negative sonographic Mora's sign, though the patient was medicated.  Pancreas: Visualized portions are within normal limits.  Spleen: 12.2 cm. Within normal limits.  Right kidney: 10.6 cm. No hydronephrosis.  Left kidney: 12.3 cm.  No hydronephrosis.  Ascites: None.  Aorta and IVC: Visualized portions are within normal limits.    IMPRESSION:  Cholelithiasis. No sonographic evidence of acute cholecystitis.      JEWEL MCKINLEY MD; Attending Radiologist  This document has been electronically signed. 2021 12:40AM    < end of copied text >      EKG: NSR at 74bpm no acute ischemic changes (personally reviewed)    Imaging Personally Reviewed:  [x] YES  [ ] NO    Consultant(s) Notes Reviewed:  [x] YES  [ ] NO      PHYSICAL EXAM:  T(F): 98 (21 @ 04:22), Max: 98 (21 @ 21:53)  HR: 83 (21 @ 04:22) (72 - 83)  BP: 139/84 (21 @ 04:22) (138/82 - 151/83)  RR: 16 (21 @ 04:22) (15 - 18)  SpO2: 99% (21 @ 04:22) (97% - 99%)    GENERAL: obese male in NAD  HEAD:  Atraumatic, Normocephalic  EYES: EOMI, PERRL, conjunctiva and sclera clear  ENMT: No tonsillar erythema, exudates, or enlargement; Moist mucous membranes  NECK: Supple, No JVD, Normal thyroid  NERVOUS SYSTEM:  Alert & Oriented X3, Moves all extremities, Sensation to light touch intact  CHEST/LUNG: Clear to auscultation bilaterally; No wheezes, rales or rhonchi  HEART: RRR, S1, S2; No murmurs, rubs, or gallops  ABDOMEN: Soft, Obese, tender to RUQ, Nondistended; Bowel sounds present  EXTREMITIES:  2+ Peripheral Pulses, No clubbing, cyanosis, or edema  LYMPH: No lymphadenopathy noted  SKIN: No rashes or lesions; +tattoos    Care Discussed with Consultants/Other Providers [x] YES  [ ] NO Patient is a 35y old  Male who presents with a chief complaint of abd pain (2021 05:44)    Admission HPI:  36 y/o M pmhx of obesity, presents to [facility] ED c/o upper abdominal since 1 pm yesterday. PT states he was in [place] prior to onset of symptoms with associated nausea. PT states he ate some fried food which may have precipitated symptoms. PT states the pain has localized to her RUQ, denying and radiation. PT admits to an episode of similar symptoms in the past which resolved on their own, and he did not receive any medical work up for. At this time pt c/o continued RUQ abdominal pain. PT denies chest pain, SOB, palpitations, fevers, chills, melena, hematochezia, recent travel or sick contacts.  (2021 05:44)    Interval HPI: 36 y/o M with PMHx of obesity, kidney stone presented to the ED complaining of RUQ pain with associated nausea since approximately 130PM on 21. He states that he had a lot of fried food the night/morning before. States that this has happened in the past but has always resolved spontaneously. He is nervous as he has never has surgery before. He works at night for [employer]. He admits to snoring and possibly apneic events, along with sleepiness after night of sleeping (attributes to his bed). He admits to non-radiating 5/10 RUQ pain, improved with IV analgesia. He states that he has no history of hypertension, nor any other cardiac history. States that he is active, walks a lot since he doesn't have a car at the moment. Able to walk >1 mile without any chest pain or dyspnea. Denies chest pain, palpitations, dyspnea, headache, dizziness, v/d. He reports no orthopnea, dyspnea on exertion or paroxysmal nocturnal dyspnea.    PRE-OP: exercise tolerance >4.0 mets [ x ] YES   [  ] NO  ; active tobacco use [ x ] YES   [  ] NO  ;  known h/o GENIA [  ] YES   [ x ] NO  ; presence of obesity  [ x ] YES   [  ] NO  ;  presence of alcohol misuse [ x ] YES   [  ] NO  ; presence of illicit drug use [x  ] YES   [  ] NO  ; personal history of complications from anesthesia [  ] YES   [ x ] NO    STOP-BANG    1.  SNORING:  Does patient snore loudly (loud enough to be heard through closed doors or their bed-partner elbows you for snoring at night)?  [ x ] YES        [  ]NO    2.  TIRED: Does patient often feel tired, fatigues, or sleepy during the daytime (such as falling asleep during driving or talking to someone)?  [ x ] YES        [  ]NO    3.  OBSERVED:  Has anyone observed patient stop breathing or choke/gasp during sleep?  [ x ] YES        [ ]NO    4.  PRESSURE:  Does patient have high blood pressure?  [  ] YES        [x ]NO    5.  Patients BMI:  Height (cm): 182.9 (21 @ 21:53)  Weight (kg): 170.1 (21 @ 21:53)  BMI (kg/m2): 50.8 (21 @ 21:53)    Is the patient's BMI greater than 35?  [ x ] YES        [  ]NO    6.  Patients age:  35y    Is the patient's age greater than 50?  [  ] YES        [ x ]NO    7.  NECK SIZE (measured around [place]):  MALES: is patient's shirt collar size 17 inches (43CM) or greater?  [ x ] YES        [ ]NO    8.  IS THE PATIENT'S GENDER MALE?  [ x ] YES        [ ]NO    SCORIN point for each yes answer       Low Risk EGNIA:                          score 0, 1 or 2       Intermediate Risk GENIA:          score 3 or 4       High Risk GENIA:                         score 5, 6, 7, or 8                                                 OR  a score of 2 or greater and MALE gender                                                 OR a score of 2 or greater and BMI greater than 35                                                 OR a score of 2 or greater and YES answer to NECK size question        I&O's Summary      PAST MEDICAL & SURGICAL HISTORY:  History of kidney stone  History of obesity    No significant past surgical history        Allergies    No Known Allergies    Intolerances        SOCIAL HISTORY  Alcohol: admits to binge drinking only on weekends 10-15 drinks (mixed cocktails) over 2 days, no drinks throughout the week  Tobacco: admits tobacco use - 0.5pack/week for past 5 years  Illicit substance use: admits THC use    FAMILY HISTORY:  Family history of DM2 in mother    Home Medications:  none      LABS:                        15.3   7.68  )-----------( 284      ( 2021 23:12 )             47.5         136  |  105  |  13  ----------------------------<  120<H>  4.4   |  23  |  0.91    Ca    8.7      2021 23:12    TPro  8.5<H>  /  Alb  3.4  /  TBili  0.4  /  DBili  x   /  AST  21  /  ALT  44  /  AlkPhos  78        CAPILLARY BLOOD GLUCOSE      MEDICATIONS  (STANDING):    MEDICATIONS  (PRN):      REVIEW OF SYSTEMS:  CONSTITUTIONAL: denies fever, chills, fatigue, weakness  HEENT: denies blurred vision, sore throat  SKIN: denies new lesions, rash  CARDIOVASCULAR: denies chest pain, chest pressure, palpitations  RESPIRATORY: denies shortness of breath, sputum production  GASTROINTESTINAL: denies vomiting, diarrhea; admits abdominal pain and nausea  GENITOURINARY: denies dysuria, discharge  NEUROLOGICAL: denies numbness, headache, focal weakness  MUSCULOSKELETAL: denies new joint pain, muscle aches  HEMATOLOGIC: denies gross bleeding, bruising  LYMPHATICS: denies enlarged lymph nodes, extremity swelling  PSYCHIATRIC: denies recent changes in anxiety, depression  ENDOCRINOLOGIC: denies sweating, cold or heat intolerance    RADIOLOGY & ADDITIONAL TESTS:    < from: US Abdomen Complete (21 @ 23:57) >    EXAM:  US ABDOMEN COMPLETE                            PROCEDURE DATE:  2021          INTERPRETATION:  CLINICAL INFORMATION: Right upper quadrant pain    COMPARISON: None available.    TECHNIQUE: Sonography of the abdomen.    FINDINGS:    Liver: Hepatic steatosis. Mild hepatomegaly, 19 cm  Bile ducts: Normal caliber. Common bile duct measures 5 mm.  Gallbladder: Multiple small shadowing gallstones in the fundus. No gallbladder wall thickening or pericholecystic fluid. Negative sonographic Mora's sign, though the patient was medicated.  Pancreas: Visualized portions are within normal limits.  Spleen: 12.2 cm. Within normal limits.  Right kidney: 10.6 cm. No hydronephrosis.  Left kidney: 12.3 cm.  No hydronephrosis.  Ascites: None.  Aorta and IVC: Visualized portions are within normal limits.    IMPRESSION:  Cholelithiasis. No sonographic evidence of acute cholecystitis.      JEWEL MCKINLEY MD; Attending Radiologist  This document has been electronically signed. 2021 12:40AM    < end of copied text >      EKG: NSR at 74bpm no acute ischemic changes (personally reviewed)    Imaging Personally Reviewed:  [x] YES  [ ] NO    Consultant(s) Notes Reviewed:  [x] YES  [ ] NO      PHYSICAL EXAM:  T(F): 98 (21 @ 04:22), Max: 98 (21 @ 21:53)  HR: 83 (21 @ 04:22) (72 - 83)  BP: 139/84 (21 @ 04:22) (138/82 - 151/83)  RR: 16 (21 @ 04:22) (15 - 18)  SpO2: 99% (21 @ 04:22) (97% - 99%)    GENERAL: obese male in NAD  HEAD:  Atraumatic, Normocephalic  EYES: EOMI, PERRL, conjunctiva and sclera clear  ENMT: No tonsillar erythema, exudates, or enlargement; Moist mucous membranes  NECK: Supple, No JVD, Normal thyroid  NERVOUS SYSTEM:  Alert & Oriented X3, Moves all extremities, Sensation to light touch intact  CHEST/LUNG: Clear to auscultation bilaterally; No wheezes, rales or rhonchi  HEART: RRR, S1, S2; No murmurs, rubs, or gallops  ABDOMEN: Soft, Obese, tender to RUQ, Nondistended; Bowel sounds present  EXTREMITIES:  2+ Peripheral Pulses, No clubbing, cyanosis, or edema  LYMPH: No lymphadenopathy noted  SKIN: No rashes or lesions; +tattoos    Care Discussed with Consultants/Other Providers [x] YES  [ ] NO

## 2021-04-13 NOTE — CONSULT NOTE ADULT - SUBJECTIVE AND OBJECTIVE BOX
36 y/o M pmhx of obesity, presents to [facility] ED c/o upper abdominal since 1 pm yesterday. PT states he was in [place] prior to onset of symptoms with associated nausea. PT states he ate some fried food which may have precipitated symptoms. PT states the pain has localized to her RUQ, denying and radiation. PT admits to an episode of similar symptoms in the past which resolved on their own, and he did not receive any medical work up for. At this time pt c/o continued RUQ abdominal pain. PT denies chest pain, SOB, palpitations, fevers, chills, melena, hematochezia, recent travel or sick contacts.     PAST MEDICAL & SURGICAL HISTORY:  No pertinent past medical history      Allergies:  No Known Allergies    Home Medications:      ROS:  Constitutional: Denies fever, fatigue or weight loss.  Skin: Denies rash.  Eyes: Denies recent vision problems or eye pain.  ENT: Denies congestion, ear pain, or sore throat.  Endocrine: Denies thyroid problems.  Cardiovascular: Denies chest pain or palpation.  Respiratory: Denies cough, shortness of breath, congestion, or wheezing.  Gastrointestinal: SEE HPI  Genitourinary: Denies dysuria.  Musculoskeletal: Denies joint swelling.  Neurologic: Denies headache.      Physical Examination:  GENERAL: No acute distress, well-developed  HEAD:  Atraumatic, Normocephalic  CHEST/LUNG: CTABL, no ronchi, rales or wheezing  HEART: RRR, no MRGs  ABDOMEN: Obese, protuberant, soft, RUQ ttp, nondistended, +bs  NEUROLOGY: A&O x 3, no focal deficits    Data:                        15.3   7.68  )-----------( 284      ( 12 Apr 2021 23:12 )             47.5     04-12    136  |  105  |  13  ----------------------------<  120<H>  4.4   |  23  |  0.91    Ca    8.7      12 Apr 2021 23:12    TPro  8.5<H>  /  Alb  3.4  /  TBili  0.4  /  DBili  x   /  AST  21  /  ALT  44  /  AlkPhos  78  04-12      LIVER FUNCTIONS - ( 12 Apr 2021 23:12 )  Alb: 3.4 g/dL / Pro: 8.5 g/dL / ALK PHOS: 78 U/L / ALT: 44 U/L / AST: 21 U/L / GGT: x             Radiology:  < from: US Abdomen Complete (04.12.21 @ 23:57) >    EXAM:  US ABDOMEN COMPLETE                            PROCEDURE DATE:  04/12/2021          INTERPRETATION:  CLINICAL INFORMATION: Right upper quadrant pain    COMPARISON: None available.    TECHNIQUE: Sonography of the abdomen.    FINDINGS:    Liver: Hepatic steatosis. Mild hepatomegaly, 19 cm  Bile ducts: Normal caliber. Common bile duct measures 5 mm.  Gallbladder: Multiple small shadowing gallstones in the fundus. No gallbladder wall thickening or pericholecystic fluid. Negative sonographic Mora's sign, though the patient was medicated.  Pancreas: Visualized portions are within normal limits.  Spleen: 12.2 cm. Within normal limits.  Right kidney: 10.6 cm. No hydronephrosis.  Left kidney: 12.3 cm.  No hydronephrosis.  Ascites: None.  Aorta and IVC: Visualized portions are within normal limits.    IMPRESSION:  Cholelithiasis. No sonographic evidence of acute cholecystitis.      JEWEL MCKINLEY MD; Attending Radiologist  This document has been electronically signed. Apr 13 2021 12:40AM    < end of copied text >

## 2021-04-13 NOTE — BRIEF OPERATIVE NOTE - NSICDXBRIEFPOSTOP_GEN_ALL_CORE_FT
POST-OP DIAGNOSIS:  Acute on chronic cholecystitis 13-Apr-2021 14:43:09  Catrachita Chapman  Cholelithiasis 13-Apr-2021 14:43:23  Catrachita Chapman

## 2021-04-13 NOTE — CONSULT NOTE ADULT - ASSESSMENT
36 y/o M with PMHx of obesity, kidney stone presented to the ED complaining of RUQ pain with associated nausea admitted for cholelithiasis with suspected cholecystitis. Hospitalist consulted for preoperative evaluation.    #Cholelithiasis/RUQ pain/Biliary Colic  - admit to surgery  - r/o acute cholecystitis - f/u NM HIDA scan  - NPO, IV fluids, pain control and antiemetics PRN  - Patient is planned for surgical intervention  - Labs reviewed and acceptable  - EKG with no acute ischemic changes  - No history of MI, active CAD, unstable arrhythmia, ADHF or severe stenotic valvular disease on exam  - RCRI 0. 3.9% 30 day risk of death, MI or cardiac arrest.  - Patient is medically optimized for proposed procedure (laparoscopic cholecystectomy) with routine hemodynamic monitoring.  - He likely has GENIA and thus will recommend 24hrs of remote telemetry/ postoperatively.    #Obesity (BMI 50.9)  - STOP-BANG score 5 - high risk for GENIA  - weight loss recommended - patient with hepatic steatosis/hepatomegaly  - will check lipid panel and hemoglobin A1C  - d/w patient and recommended OP follow up for polysomnography  - recommend remote telemetry and  for 24 hours postoperatively  - O2 support as needed postoperatively  - inpatient pulmonology evaluation with Dr. Serrano    #EtOH misuse  - patient admits to binge drinking on the weekend  - patient with hepatic steatosis/hepatomegaly, LFTs normal  - does not drink during the day, however was advised on EtOH cessation  - no need for CIWA monitoring at present    #Tobacco Use  - smokes 0.5 packs per week x 5 years  - counseled on tobacco cessation, does not want NRT    #Need for prophylactic measure  - VTE ppx: SCDs preoperatively. Chemical VTE ppx per surgery. Would recommend lovenox post-operatively. 34 y/o M with PMHx of obesity, kidney stone presented to the ED complaining of RUQ pain with associated nausea admitted for cholelithiasis with suspected cholecystitis. Hospitalist consulted for preoperative evaluation.    #Cholelithiasis/RUQ pain/Biliary Colic  - admit to surgery  - r/o acute cholecystitis - f/u NM HIDA scan  - NPO, IV fluids, pain control and antiemetics PRN, empiric IV zosyn  - Patient is planned for potential surgical intervention  - Labs reviewed and acceptable  - EKG with no acute ischemic changes  - No history of MI, active CAD, unstable arrhythmia, ADHF or severe stenotic valvular disease on exam  - RCRI 0. 3.9% 30 day risk of death, MI or cardiac arrest.  - Patient is medically optimized for proposed procedure (laparoscopic cholecystectomy) with routine hemodynamic monitoring.  - He likely has GENIA and thus will recommend 24hrs of remote telemetry/ postoperatively.    #Obesity (BMI 50.9)  - STOP-BANG score 5 - high risk for GENIA  - weight loss recommended - patient with hepatic steatosis/hepatomegaly  - will check lipid panel and hemoglobin A1C  - d/w patient and recommended OP follow up for polysomnography  - recommend remote telemetry and  for 24 hours postoperatively  - O2 support as needed postoperatively  - inpatient pulmonology evaluation with Dr. Serrano    #EtOH misuse  - patient admits to binge drinking on the weekends  - patient with hepatic steatosis/hepatomegaly, LFTs normal  - does not drink during the day, however was advised on EtOH cessation  - no need for CIWA monitoring at present    #Tobacco Use  - smokes 0.5 packs per week x 5 years  - counseled on tobacco cessation, does not want NRT    #Need for prophylactic measure  - VTE ppx: SCDs preoperatively. Chemical VTE ppx per surgery. Would recommend lovenox post-operatively. 36 y/o M with PMHx of obesity, kidney stone presented to the ED complaining of RUQ pain with associated nausea admitted for cholelithiasis with suspected cholecystitis. Hospitalist consulted for preoperative evaluation.    #Cholelithiasis/RUQ pain/Biliary Colic  - admit to surgery  - r/o acute cholecystitis - f/u NM HIDA scan  - NPO, IV fluids, pain control and antiemetics PRN, empiric IV zosyn  - Patient is planned for potential surgical intervention  - Labs reviewed and acceptable  - EKG with no acute ischemic changes  - No history of MI, active CAD, unstable arrhythmia, ADHF or severe stenotic valvular disease on exam  - RCRI 0. 3.9% 30 day risk of death, MI or cardiac arrest.  - Patient is medically optimized for proposed procedure (laparoscopic cholecystectomy) with routine hemodynamic monitoring.  - He likely has GENIA and thus will recommend 24hrs of remote telemetry/ postoperatively.    #Obesity (BMI 50.9)  - STOP-BANG score 5 - high risk for GENIA  - weight loss recommended - patient with hepatic steatosis/hepatomegaly  - will check lipid panel and hemoglobin A1C  - d/w patient and recommended OP follow up for polysomnography  - recommend remote telemetry and  for 24 hours postoperatively  - O2 support as needed postoperatively  - inpatient pulmonology evaluation for CPAP with Dr. Serrano    #EtOH misuse  - patient admits to binge drinking on the weekends  - patient with hepatic steatosis/hepatomegaly, LFTs normal  - does not drink during the weekdays, however was advised on EtOH cessation  - no need for CIWA monitoring at present    #Tobacco Use  - smokes 0.5 packs per week x 5 years  - counseled on tobacco cessation  - NRT with nicotine patch    #Need for prophylactic measure  - VTE ppx: SCDs preoperatively. Chemical VTE ppx per surgery. Would recommend lovenox post-operatively.

## 2021-04-13 NOTE — DISCHARGE NOTE PROVIDER - NSDCFUADDAPPT_GEN_ALL_CORE_FT
Follow up in 2 weeks. Follow up in 2 weeks with Dr. Chaudhary  Follow up with Dr. Serrano for evaluation of GENIA.

## 2021-04-13 NOTE — H&P ADULT - NSHPPHYSICALEXAM_GEN_ALL_CORE
Physical Examination:  GENERAL: No acute distress, well-developed  HEAD:  Atraumatic, Normocephalic  CHEST/LUNG: CTABL, no ronchi, rales or wheezing  HEART: RRR, no MRGs  ABDOMEN: Obese, protuberant, soft, RUQ ttp, nondistended, +bs  NEUROLOGY: A&O x 3, no focal deficits

## 2021-04-13 NOTE — H&P ADULT - NSICDXPROBLEMPLAN1_GEN_ALL_CORE_FT
Assessment:   34 y/o M pmhx obesity, presenting to [facility] ED with upper abdominal pain localized to RUQ with cbc abd cmp wnl, and GB US consistent with cholelithiasis, with persistent RUQ abdominal pain,   Plan:  - f/u HIDA scan, pt may benefit from cholecystectomy upon this admission vs elective cholecystectomy   - rec medical clearance pre-op  - pain control, supportive care  - zofran prn for nausea  - discussed with Dr. Chaudhary

## 2021-04-13 NOTE — DISCHARGE NOTE PROVIDER - HOSPITAL COURSE
36 yo male with obesity, possible GENIA here for abdominal pain to ER.  Diagnosed with biliary dyskinesia.  Decision made to take patient to OR.  Pt underwent a Laparoscopic cholecystectomy.  Pt tolerated procedure, and was recovered in PACU.  Pt then transferred to the floor.  Pt did well overnight.  Tolerated a clear liquid diet, ambulated, voided.  Pt was advised to continue deep breathing/ incentive spirometer.  Diet was advanced in the AM, patient tolerated.     Pt stable for discharge home, with outpatient follow up. 36 yo male with obesity, possible GENIA here for abdominal pain to ER.  Diagnosed with biliary dyskinesia.  Decision made to take patient to OR.  Pt underwent a Laparoscopic cholecystectomy.  Pt tolerated procedure, and was recovered in PACU.  Pt then transferred to the floor.  Pt did well overnight.  Tolerated a clear liquid diet, ambulated, voided.  Pt was advised to continue deep breathing/ incentive spirometer.  Diet was advanced in the AM, patient tolerated.     Pt stable for discharge home, with outpatient follow up with Dr. Chaudhary and Pulmonary for evaluation of Sleep apnea.

## 2021-04-13 NOTE — DISCHARGE NOTE PROVIDER - NSDCMRMEDTOKEN_GEN_ALL_CORE_FT
Percocet 5 mg-325 mg oral tablet: 1 tab(s) orally every 6 hours, As Needed -for severe pain MDD:4

## 2021-04-13 NOTE — CONSULT NOTE ADULT - NSICDXPROBLEMREC1_GEN_ALL_CORE_FT
Assessment:   34 y/o M pmhx obesity, presenting to [facility] ED with upper abdominal pain localized to RUQ with cbc abd cmp wnl, and GB US consistent with cholelithiasis, with persistent RUQ abdominal pain,   Plan:  - f/u HIDA scan, pt may benefit from cholecystectomy upon this admission vs elective cholecystectomy   - rec medical clearance pre-op  - pain control, supportive care  - zofran prn for nausea  - discussed with Dr. Chaudhary

## 2021-04-14 ENCOUNTER — TRANSCRIPTION ENCOUNTER (OUTPATIENT)
Age: 36
End: 2021-04-14

## 2021-04-14 VITALS
HEART RATE: 97 BPM | RESPIRATION RATE: 16 BRPM | OXYGEN SATURATION: 92 % | SYSTOLIC BLOOD PRESSURE: 156 MMHG | TEMPERATURE: 100 F | DIASTOLIC BLOOD PRESSURE: 79 MMHG

## 2021-04-14 LAB
A1C WITH ESTIMATED AVERAGE GLUCOSE RESULT: 6.2 % — HIGH (ref 4–5.6)
ALBUMIN SERPL ELPH-MCNC: 3 G/DL — LOW (ref 3.3–5)
ALP SERPL-CCNC: 68 U/L — SIGNIFICANT CHANGE UP (ref 40–120)
ALT FLD-CCNC: 96 U/L — HIGH (ref 12–78)
ANION GAP SERPL CALC-SCNC: 5 MMOL/L — SIGNIFICANT CHANGE UP (ref 5–17)
AST SERPL-CCNC: 57 U/L — HIGH (ref 15–37)
BASOPHILS # BLD AUTO: 0.02 K/UL — SIGNIFICANT CHANGE UP (ref 0–0.2)
BASOPHILS NFR BLD AUTO: 0.2 % — SIGNIFICANT CHANGE UP (ref 0–2)
BILIRUB SERPL-MCNC: 0.9 MG/DL — SIGNIFICANT CHANGE UP (ref 0.2–1.2)
BUN SERPL-MCNC: 9 MG/DL — SIGNIFICANT CHANGE UP (ref 7–23)
CALCIUM SERPL-MCNC: 8.7 MG/DL — SIGNIFICANT CHANGE UP (ref 8.5–10.1)
CHLORIDE SERPL-SCNC: 103 MMOL/L — SIGNIFICANT CHANGE UP (ref 96–108)
CHOLEST SERPL-MCNC: 131 MG/DL — SIGNIFICANT CHANGE UP
CO2 SERPL-SCNC: 31 MMOL/L — SIGNIFICANT CHANGE UP (ref 22–31)
COVID-19 SPIKE DOMAIN AB INTERP: NEGATIVE — SIGNIFICANT CHANGE UP
COVID-19 SPIKE DOMAIN ANTIBODY RESULT: 0.4 U/ML — SIGNIFICANT CHANGE UP
CREAT SERPL-MCNC: 0.95 MG/DL — SIGNIFICANT CHANGE UP (ref 0.5–1.3)
EOSINOPHIL # BLD AUTO: 0.01 K/UL — SIGNIFICANT CHANGE UP (ref 0–0.5)
EOSINOPHIL NFR BLD AUTO: 0.1 % — SIGNIFICANT CHANGE UP (ref 0–6)
ESTIMATED AVERAGE GLUCOSE: 131 MG/DL — HIGH (ref 68–114)
GLUCOSE SERPL-MCNC: 113 MG/DL — HIGH (ref 70–99)
HCT VFR BLD CALC: 42.9 % — SIGNIFICANT CHANGE UP (ref 39–50)
HDLC SERPL-MCNC: 62 MG/DL — SIGNIFICANT CHANGE UP
HGB BLD-MCNC: 13.6 G/DL — SIGNIFICANT CHANGE UP (ref 13–17)
IMM GRANULOCYTES NFR BLD AUTO: 0.6 % — SIGNIFICANT CHANGE UP (ref 0–1.5)
LIPID PNL WITH DIRECT LDL SERPL: 46 MG/DL — SIGNIFICANT CHANGE UP
LYMPHOCYTES # BLD AUTO: 1.76 K/UL — SIGNIFICANT CHANGE UP (ref 1–3.3)
LYMPHOCYTES # BLD AUTO: 17 % — SIGNIFICANT CHANGE UP (ref 13–44)
MAGNESIUM SERPL-MCNC: 2.2 MG/DL — SIGNIFICANT CHANGE UP (ref 1.6–2.6)
MCHC RBC-ENTMCNC: 26.9 PG — LOW (ref 27–34)
MCHC RBC-ENTMCNC: 31.7 GM/DL — LOW (ref 32–36)
MCV RBC AUTO: 84.8 FL — SIGNIFICANT CHANGE UP (ref 80–100)
MONOCYTES # BLD AUTO: 1.14 K/UL — HIGH (ref 0–0.9)
MONOCYTES NFR BLD AUTO: 11 % — SIGNIFICANT CHANGE UP (ref 2–14)
NEUTROPHILS # BLD AUTO: 7.38 K/UL — SIGNIFICANT CHANGE UP (ref 1.8–7.4)
NEUTROPHILS NFR BLD AUTO: 71.1 % — SIGNIFICANT CHANGE UP (ref 43–77)
NON HDL CHOLESTEROL: 69 MG/DL — SIGNIFICANT CHANGE UP
NRBC # BLD: 0 /100 WBCS — SIGNIFICANT CHANGE UP (ref 0–0)
PHOSPHATE SERPL-MCNC: 3.7 MG/DL — SIGNIFICANT CHANGE UP (ref 2.5–4.5)
PLATELET # BLD AUTO: 276 K/UL — SIGNIFICANT CHANGE UP (ref 150–400)
POTASSIUM SERPL-MCNC: 4.4 MMOL/L — SIGNIFICANT CHANGE UP (ref 3.5–5.3)
POTASSIUM SERPL-SCNC: 4.4 MMOL/L — SIGNIFICANT CHANGE UP (ref 3.5–5.3)
PROT SERPL-MCNC: 7.5 G/DL — SIGNIFICANT CHANGE UP (ref 6–8.3)
RBC # BLD: 5.06 M/UL — SIGNIFICANT CHANGE UP (ref 4.2–5.8)
RBC # FLD: 14.1 % — SIGNIFICANT CHANGE UP (ref 10.3–14.5)
SARS-COV-2 IGG+IGM SERPL QL IA: 0.4 U/ML — SIGNIFICANT CHANGE UP
SARS-COV-2 IGG+IGM SERPL QL IA: NEGATIVE — SIGNIFICANT CHANGE UP
SODIUM SERPL-SCNC: 139 MMOL/L — SIGNIFICANT CHANGE UP (ref 135–145)
TRIGL SERPL-MCNC: 114 MG/DL — SIGNIFICANT CHANGE UP
WBC # BLD: 10.37 K/UL — SIGNIFICANT CHANGE UP (ref 3.8–10.5)
WBC # FLD AUTO: 10.37 K/UL — SIGNIFICANT CHANGE UP (ref 3.8–10.5)

## 2021-04-14 PROCEDURE — 87635 SARS-COV-2 COVID-19 AMP PRB: CPT

## 2021-04-14 PROCEDURE — 96375 TX/PRO/DX INJ NEW DRUG ADDON: CPT

## 2021-04-14 PROCEDURE — 84443 ASSAY THYROID STIM HORMONE: CPT

## 2021-04-14 PROCEDURE — 86880 COOMBS TEST DIRECT: CPT

## 2021-04-14 PROCEDURE — 86850 RBC ANTIBODY SCREEN: CPT

## 2021-04-14 PROCEDURE — 83735 ASSAY OF MAGNESIUM: CPT

## 2021-04-14 PROCEDURE — 76700 US EXAM ABDOM COMPLETE: CPT

## 2021-04-14 PROCEDURE — 82803 BLOOD GASES ANY COMBINATION: CPT

## 2021-04-14 PROCEDURE — C1889: CPT

## 2021-04-14 PROCEDURE — 99232 SBSQ HOSP IP/OBS MODERATE 35: CPT

## 2021-04-14 PROCEDURE — 86900 BLOOD TYPING SEROLOGIC ABO: CPT

## 2021-04-14 PROCEDURE — 86769 SARS-COV-2 COVID-19 ANTIBODY: CPT

## 2021-04-14 PROCEDURE — 93005 ELECTROCARDIOGRAM TRACING: CPT

## 2021-04-14 PROCEDURE — 85610 PROTHROMBIN TIME: CPT

## 2021-04-14 PROCEDURE — 99285 EMERGENCY DEPT VISIT HI MDM: CPT

## 2021-04-14 PROCEDURE — 87075 CULTR BACTERIA EXCEPT BLOOD: CPT

## 2021-04-14 PROCEDURE — 83036 HEMOGLOBIN GLYCOSYLATED A1C: CPT

## 2021-04-14 PROCEDURE — 87205 SMEAR GRAM STAIN: CPT

## 2021-04-14 PROCEDURE — 86905 BLOOD TYPING RBC ANTIGENS: CPT

## 2021-04-14 PROCEDURE — 87070 CULTURE OTHR SPECIMN AEROBIC: CPT

## 2021-04-14 PROCEDURE — 85730 THROMBOPLASTIN TIME PARTIAL: CPT

## 2021-04-14 PROCEDURE — 96374 THER/PROPH/DIAG INJ IV PUSH: CPT

## 2021-04-14 PROCEDURE — 96376 TX/PRO/DX INJ SAME DRUG ADON: CPT

## 2021-04-14 PROCEDURE — 83690 ASSAY OF LIPASE: CPT

## 2021-04-14 PROCEDURE — 80061 LIPID PANEL: CPT

## 2021-04-14 PROCEDURE — 85025 COMPLETE CBC W/AUTO DIFF WBC: CPT

## 2021-04-14 PROCEDURE — 86901 BLOOD TYPING SEROLOGIC RH(D): CPT

## 2021-04-14 PROCEDURE — 80053 COMPREHEN METABOLIC PANEL: CPT

## 2021-04-14 PROCEDURE — 84100 ASSAY OF PHOSPHORUS: CPT

## 2021-04-14 PROCEDURE — 87040 BLOOD CULTURE FOR BACTERIA: CPT

## 2021-04-14 PROCEDURE — 88304 TISSUE EXAM BY PATHOLOGIST: CPT

## 2021-04-14 PROCEDURE — 36415 COLL VENOUS BLD VENIPUNCTURE: CPT

## 2021-04-14 PROCEDURE — 86870 RBC ANTIBODY IDENTIFICATION: CPT

## 2021-04-14 RX ORDER — OXYCODONE AND ACETAMINOPHEN 5; 325 MG/1; MG/1
1 TABLET ORAL
Qty: 15 | Refills: 0
Start: 2021-04-14

## 2021-04-14 RX ADMIN — BENZOCAINE AND MENTHOL 1 LOZENGE: 5; 1 LIQUID ORAL at 05:42

## 2021-04-14 RX ADMIN — OXYCODONE HYDROCHLORIDE 10 MILLIGRAM(S): 5 TABLET ORAL at 01:39

## 2021-04-14 RX ADMIN — OXYCODONE HYDROCHLORIDE 10 MILLIGRAM(S): 5 TABLET ORAL at 08:47

## 2021-04-14 RX ADMIN — HEPARIN SODIUM 5000 UNIT(S): 5000 INJECTION INTRAVENOUS; SUBCUTANEOUS at 05:39

## 2021-04-14 RX ADMIN — OXYCODONE HYDROCHLORIDE 10 MILLIGRAM(S): 5 TABLET ORAL at 02:38

## 2021-04-14 RX ADMIN — BENZOCAINE AND MENTHOL 1 LOZENGE: 5; 1 LIQUID ORAL at 01:39

## 2021-04-14 RX ADMIN — OXYCODONE HYDROCHLORIDE 10 MILLIGRAM(S): 5 TABLET ORAL at 07:47

## 2021-04-14 NOTE — PROGRESS NOTE ADULT - ATTENDING COMMENTS
Patient seen and examined earlier this AM with the surgical PA.  He states that he was feeling well and was tolerating clear liquids.  He reported tolerable vee-umbilical "soreness."  His abdomen was soft, obese, and appropriately tender and his incisions were clean with intact steri-strips.  Labs reviewed and are within normal expected post-op limits.  He is stable for discharge home today from a surgical standpoint once he is tolerating a diet and cleared by all consulting teams.  Post-op instructions and follow-up information were reviewed with the patient.

## 2021-04-14 NOTE — PROGRESS NOTE ADULT - SUBJECTIVE AND OBJECTIVE BOX
The patient was interviewed and evaluated. OOB    35y Male    T(C): 37.7 (04-14-21 @ 12:00), Max: 37.7 (04-14-21 @ 12:00)  HR: 97 (04-14-21 @ 12:00) (78 - 97)  BP: 156/79 (04-14-21 @ 12:00) (131/83 - 173/80)  RR: 16 (04-14-21 @ 12:00) (14 - 20)  SpO2: 92% (04-14-21 @ 12:00) (92% - 98%)  Wt(kg): --    No Nausea/vomiting, recall, sore throat or headache.    No anesthesia related complaints or sequelae.        
Patient is a 35y old  Male who presents with a chief complaint of abd pain (14 Apr 2021 06:08)      INTERVAL HPI/OVERNIGHT EVENTS:  Patient seen and examined approximately 9:30AM. Doing well this morning. Mild abdominal pain this AM. No N/V. Tolerating diet this AM. No chest pain/SOB/palpitations.     MEDICATIONS  (STANDING):  heparin   Injectable 5000 Unit(s) SubCutaneous every 8 hours  lactated ringers. 1000 milliLiter(s) (100 mL/Hr) IV Continuous <Continuous>    MEDICATIONS  (PRN):  acetaminophen   Tablet .. 650 milliGRAM(s) Oral every 6 hours PRN Mild Pain (1 - 3)  benzocaine 15 mG/menthol 3.6 mG (Sugar-Free) Lozenge 1 Lozenge Oral every 2 hours PRN Sore Throat  ondansetron Injectable 4 milliGRAM(s) IV Push every 6 hours PRN Nausea and/or Vomiting  oxyCODONE    IR 5 milliGRAM(s) Oral every 6 hours PRN Moderate Pain (4 - 6)  oxyCODONE    IR 10 milliGRAM(s) Oral every 6 hours PRN Severe Pain (7 - 10)      Allergies    No Known Allergies    Intolerances        REVIEW OF SYSTEMS:  as per HPI    Vital Signs Last 24 Hrs  T(C): 37.7 (14 Apr 2021 12:00), Max: 37.7 (14 Apr 2021 12:00)  T(F): 99.9 (14 Apr 2021 12:00), Max: 99.9 (14 Apr 2021 12:00)  HR: 97 (14 Apr 2021 12:00) (78 - 97)  BP: 156/79 (14 Apr 2021 12:00) (131/83 - 173/80)  BP(mean): --  RR: 16 (14 Apr 2021 12:00) (14 - 20)  SpO2: 92% (14 Apr 2021 12:00) (92% - 98%)    PHYSICAL EXAM:  GENERAL: NAD  HEENT:  anicteric, moist mucous membranes  CHEST/LUNG:  CTA b/l, no rales, wheezes, or rhonchi  HEART:  RRR, S1, S2  ABDOMEN:  BS+, soft, nontender, nondistended, +surgical incisions clean and dry with steri-strips intact  EXTREMITIES: no edema, cyanosis, or calf tenderness  NERVOUS SYSTEM: answers questions and follows commands appropriately    LABS:                        13.6   10.37 )-----------( 276      ( 14 Apr 2021 06:37 )             42.9     CBC Full  -  ( 14 Apr 2021 06:37 )  WBC Count : 10.37 K/uL  Hemoglobin : 13.6 g/dL  Hematocrit : 42.9 %  Platelet Count - Automated : 276 K/uL  Mean Cell Volume : 84.8 fl  Mean Cell Hemoglobin : 26.9 pg  Mean Cell Hemoglobin Concentration : 31.7 gm/dL  Auto Neutrophil # : 7.38 K/uL  Auto Lymphocyte # : 1.76 K/uL  Auto Monocyte # : 1.14 K/uL  Auto Eosinophil # : 0.01 K/uL  Auto Basophil # : 0.02 K/uL  Auto Neutrophil % : 71.1 %  Auto Lymphocyte % : 17.0 %  Auto Monocyte % : 11.0 %  Auto Eosinophil % : 0.1 %  Auto Basophil % : 0.2 %    14 Apr 2021 06:37    139    |  103    |  9      ----------------------------<  113    4.4     |  31     |  0.95     Ca    8.7        14 Apr 2021 06:37  Phos  3.7       14 Apr 2021 06:37  Mg     2.2       14 Apr 2021 06:37    TPro  7.5    /  Alb  3.0    /  TBili  0.9    /  DBili  x      /  AST  57     /  ALT  96     /  AlkPhos  68     14 Apr 2021 06:37    PT/INR - ( 13 Apr 2021 09:13 )   PT: 13.7 sec;   INR: 1.18 ratio         PTT - ( 13 Apr 2021 09:13 )  PTT:33.2 sec    CAPILLARY BLOOD GLUCOSE            Culture - Body Fluid with Gram Stain (collected 04-13-21 @ 16:57)  Source: .Body Fluid gallbladder fluid c/s  Gram Stain (04-13-21 @ 20:09):    No polymorphonuclear cells seen    No organisms seen    by cytocentrifuge  Preliminary Report (04-14-21 @ 12:05):    No growth      
Post Operative Note  Patient: FREDDIE WESTFALL 35y (1985) Male   MRN: [id number]  Location: [address]  Visit: 04-13-21 Inpatient  Date: 04-13-21 @ 18:27    Procedure: s/p lap cholecystectomy,     Subjective:   34 y/o M seen and examined at bedside. Pt offers no complaints at this time in NAD. Pt tolerating CLD without N/V. PT denies dizziness, chest pain, sob, nausea, vomiting, abdominal pain, or urinary complaints.    Objective:  Vitals: T(F): 98.8 (04-13-21 @ 16:40), Max: 99.8 (04-13-21 @ 08:49)  HR: 96 (04-13-21 @ 16:40)  BP: 151/87 (04-13-21 @ 16:40) (138/82 - 173/80)  RR: 18 (04-13-21 @ 16:40)  SpO2: 98% (04-13-21 @ 16:40)    In:   04-13-21 @ 07:01  -  04-13-21 @ 18:27  --------------------------------------------------------  IN: 855 mL      IV Fluids: lactated ringers. 1000 milliLiter(s) (100 mL/Hr) IV Continuous <Continuous>      Out:   04-13-21 @ 07:01  -  04-13-21 @ 18:27  --------------------------------------------------------  OUT: 1050 mL      Voided Urine:   04-13-21 @ 07:01  -  04-13-21 @ 18:27  --------------------------------------------------------  OUT: 1050 mL    PHYSICAL EXAM  GENERAL:  Well-nourished, well-developed Male lying comfortably in bed in NAD  CARDIO:  RRR, no MRGs  RESPIRATORY:  CTABL, no ronchi, rales or wheezing  ABDOMEN:  Soft, nonttp, nondistended, +bs, incisional dressings c/d/i  EXTREMITIES: No calf tenderness    Medications: [Standing]  acetaminophen  IVPB .. 1000 milliGRAM(s) IV Intermittent once PRN  heparin   Injectable 5000 Unit(s) SubCutaneous every 8 hours  lactated ringers. 1000 milliLiter(s) IV Continuous <Continuous>  ondansetron Injectable 4 milliGRAM(s) IV Push every 6 hours PRN  oxyCODONE    IR 5 milliGRAM(s) Oral every 6 hours PRN  oxyCODONE    IR 10 milliGRAM(s) Oral every 6 hours PRN    Medications: [PRN]  acetaminophen  IVPB .. 1000 milliGRAM(s) IV Intermittent once PRN  heparin   Injectable 5000 Unit(s) SubCutaneous every 8 hours  lactated ringers. 1000 milliLiter(s) IV Continuous <Continuous>  ondansetron Injectable 4 milliGRAM(s) IV Push every 6 hours PRN  oxyCODONE    IR 5 milliGRAM(s) Oral every 6 hours PRN  oxyCODONE    IR 10 milliGRAM(s) Oral every 6 hours PRN    Labs:                        15.3   7.68  )-----------( 284      ( 12 Apr 2021 23:12 )             47.5     04-12    136  |  105  |  13  ----------------------------<  120<H>  4.4   |  23  |  0.91    Ca    8.7      12 Apr 2021 23:12    TPro  8.5<H>  /  Alb  3.4  /  TBili  0.4  /  DBili  x   /  AST  21  /  ALT  44  /  AlkPhos  78  04-12    PT/INR - ( 13 Apr 2021 09:13 )   PT: 13.7 sec;   INR: 1.18 ratio         PTT - ( 13 Apr 2021 09:13 )  PTT:33.2 sec    Assessment:  34 y/o M s/p laparoscopic cholecystectomy, tolerating CLD,     Plan:  - adv diet as tolerated  - d/c planning for am  - pain control, supportive care  - zofran prn for nausea   - Incentive spirometry  - OOB, ambulation as tolerated  - SCD's  - Will continue to monitor  
POD # 1    SUBJECTIVE:  36 y/o M Seen and examined at bedside with no acute overnight events. Pt offers no complaints at this time in NAD. PT tolerating CLD without N/V. PT denies any fevers, chills, chest pain, shortness of breath, abdominal pain, nausea, vomiting or diarrhea.    Vital Signs Last 24 Hrs  T(C): 37.5 (13 Apr 2021 22:31), Max: 37.7 (13 Apr 2021 08:49)  T(F): 99.5 (13 Apr 2021 22:31), Max: 99.8 (13 Apr 2021 08:49)  HR: 78 (13 Apr 2021 22:31) (77 - 96)  BP: 131/83 (13 Apr 2021 22:31) (131/83 - 173/80)  BP(mean): --  RR: 20 (13 Apr 2021 22:31) (14 - 20)  SpO2: 97% (13 Apr 2021 22:31) (97% - 100%)    PHYSICAL EXAM:  GENERAL: No acute distress, well-developed  ABDOMEN: Obese, protuberant, soft, nonttp, nondistended, dressings c/d/i   NEUROLOGY: A&O x 3, no focal deficits    I&O's Summary    13 Apr 2021 07:01  -  14 Apr 2021 05:39  --------------------------------------------------------  IN: 1105 mL / OUT: 1450 mL / NET: -345 mL      I&O's Detail    13 Apr 2021 07:01  -  14 Apr 2021 05:39  --------------------------------------------------------  IN:    Lactated Ringers: 75 mL    Lactated Ringers: 300 mL    Oral Fluid: 730 mL  Total IN: 1105 mL    OUT:    Voided (mL): 1450 mL  Total OUT: 1450 mL    Total NET: -345 mL        MEDICATIONS  (STANDING):  heparin   Injectable 5000 Unit(s) SubCutaneous every 8 hours  lactated ringers. 1000 milliLiter(s) (100 mL/Hr) IV Continuous <Continuous>    MEDICATIONS  (PRN):  acetaminophen   Tablet .. 650 milliGRAM(s) Oral every 6 hours PRN Mild Pain (1 - 3)  benzocaine 15 mG/menthol 3.6 mG (Sugar-Free) Lozenge 1 Lozenge Oral every 2 hours PRN Sore Throat  ondansetron Injectable 4 milliGRAM(s) IV Push every 6 hours PRN Nausea and/or Vomiting  oxyCODONE    IR 5 milliGRAM(s) Oral every 6 hours PRN Moderate Pain (4 - 6)  oxyCODONE    IR 10 milliGRAM(s) Oral every 6 hours PRN Severe Pain (7 - 10)    LABS:                        15.3   7.68  )-----------( 284      ( 12 Apr 2021 23:12 )             47.5     04-12    136  |  105  |  13  ----------------------------<  120<H>  4.4   |  23  |  0.91    Ca    8.7      12 Apr 2021 23:12    TPro  8.5<H>  /  Alb  3.4  /  TBili  0.4  /  DBili  x   /  AST  21  /  ALT  44  /  AlkPhos  78  04-12    PT/INR - ( 13 Apr 2021 09:13 )   PT: 13.7 sec;   INR: 1.18 ratio         PTT - ( 13 Apr 2021 09:13 )  PTT:33.2 sec      ASSESSMENT  36 y/o M POD # 1 s/p laparoscopic cholecystectomy tolerating CLD, without N/V,    PLAN  - adv to low fat diet, f/u diet tolerance  - d/c planning for today   - pain control, supportive care  - OOB, ambulation as tolerated  - serial abdominal exams  - day team to discuss with Dr. Chaudhary    Surgical Team Contact Information  Spectralink: Ext: [phone] or [phone]  Pager: [phone]
Date/Time Patient Seen:  		  Referring MD:   Data Reviewed	       Patient is a 35y old  Male who presents with a chief complaint of abd pain (14 Apr 2021 05:38)      Subjective/HPI     PAST MEDICAL & SURGICAL HISTORY:  No pertinent past medical history    No significant past surgical history          Medication list         MEDICATIONS  (STANDING):  heparin   Injectable 5000 Unit(s) SubCutaneous every 8 hours  lactated ringers. 1000 milliLiter(s) (100 mL/Hr) IV Continuous <Continuous>    MEDICATIONS  (PRN):  acetaminophen   Tablet .. 650 milliGRAM(s) Oral every 6 hours PRN Mild Pain (1 - 3)  benzocaine 15 mG/menthol 3.6 mG (Sugar-Free) Lozenge 1 Lozenge Oral every 2 hours PRN Sore Throat  ondansetron Injectable 4 milliGRAM(s) IV Push every 6 hours PRN Nausea and/or Vomiting  oxyCODONE    IR 5 milliGRAM(s) Oral every 6 hours PRN Moderate Pain (4 - 6)  oxyCODONE    IR 10 milliGRAM(s) Oral every 6 hours PRN Severe Pain (7 - 10)         Vitals log        ICU Vital Signs Last 24 Hrs  T(C): 36.9 (14 Apr 2021 05:40), Max: 37.7 (13 Apr 2021 08:49)  T(F): 98.5 (14 Apr 2021 05:40), Max: 99.8 (13 Apr 2021 08:49)  HR: 90 (14 Apr 2021 05:40) (77 - 96)  BP: 146/88 (14 Apr 2021 05:40) (131/83 - 173/80)  BP(mean): --  ABP: --  ABP(mean): --  RR: 20 (14 Apr 2021 05:40) (14 - 20)  SpO2: 95% (14 Apr 2021 05:40) (95% - 100%)           Input and Output:  I&O's Detail    13 Apr 2021 07:01  -  14 Apr 2021 06:08  --------------------------------------------------------  IN:    Lactated Ringers: 75 mL    Lactated Ringers: 300 mL    Oral Fluid: 730 mL  Total IN: 1105 mL    OUT:    Voided (mL): 1450 mL  Total OUT: 1450 mL    Total NET: -345 mL          Lab Data                        15.3   7.68  )-----------( 284      ( 12 Apr 2021 23:12 )             47.5     04-12    136  |  105  |  13  ----------------------------<  120<H>  4.4   |  23  |  0.91    Ca    8.7      12 Apr 2021 23:12    TPro  8.5<H>  /  Alb  3.4  /  TBili  0.4  /  DBili  x   /  AST  21  /  ALT  44  /  AlkPhos  78  04-12            Review of Systems	      Objective     Physical Examination    heart s1s2  lung dc BS  abd soft  head nc  obese  cn grossly int      Pertinent Lab findings & Imaging      Everette:  NO   Adequate UO     I&O's Detail    13 Apr 2021 07:01  -  14 Apr 2021 06:08  --------------------------------------------------------  IN:    Lactated Ringers: 75 mL    Lactated Ringers: 300 mL    Oral Fluid: 730 mL  Total IN: 1105 mL    OUT:    Voided (mL): 1450 mL  Total OUT: 1450 mL    Total NET: -345 mL               Discussed with:     Cultures:	        Radiology

## 2021-04-14 NOTE — PROGRESS NOTE ADULT - ASSESSMENT
POD 1 -   sp Lap Kenyatta -   Surgery follow up noted  36 y/o M pmhx obesity, presenting to [facility] ED with upper abdominal pain localized to RUQ with cbc abd cmp wnl, and GB US consistent with cholelithiasis, with persistent RUQ abdominal pain,   on DVT p  pain rx regimen -   GENIA eval - snoring - phenotype - sx - emp settings CPAP - follow up as outpatient  smoking cess ed and counseling - NRT -   ambulate  check Sat on RA on exertion
36 y/o M with PMHx of obesity, kidney stone presented to the ED complaining of RUQ pain with associated nausea admitted for cholelithiasis with suspected cholecystitis. Hospitalist consulted for preoperative evaluation.    #Cholelithiasis/RUQ pain/Biliary Colic  -s/p cholecystectomy  -tolerating diet  -surgery planning for discharge.     #elevated BP: likely somewhat due to pain.  -outpatient followup and monitoring to consider possible medication.    #Obesity (BMI 50.9)  - encourage weight loss.  -outpatient workup for GENIA.     #EtOH misuse  - patient admits to binge drinking on the weekends  - patient with hepatic steatosis/hepatomegaly, LFTs normal  - does not drink during the weekdays, however was advised on EtOH cessation  - no need for CIWA monitoring at present    #Tobacco Use  - smokes 0.5 packs per week x 5 years  - counseled on tobacco cessation  - NRT with nicotine patch    #Need for prophylactic measure  - VTE ppx: DVT ppx if ok with surgery.

## 2021-04-15 PROBLEM — Z78.9 OTHER SPECIFIED HEALTH STATUS: Chronic | Status: ACTIVE | Noted: 2021-04-12

## 2021-04-15 PROBLEM — Z00.00 ENCOUNTER FOR PREVENTIVE HEALTH EXAMINATION: Status: ACTIVE | Noted: 2021-04-15

## 2021-04-18 LAB
CULTURE RESULTS: SIGNIFICANT CHANGE UP
CULTURE RESULTS: SIGNIFICANT CHANGE UP
SPECIMEN SOURCE: SIGNIFICANT CHANGE UP
SPECIMEN SOURCE: SIGNIFICANT CHANGE UP

## 2021-04-25 DIAGNOSIS — E66.01 MORBID (SEVERE) OBESITY DUE TO EXCESS CALORIES: ICD-10-CM

## 2021-04-29 ENCOUNTER — APPOINTMENT (OUTPATIENT)
Dept: SURGERY | Facility: CLINIC | Age: 36
End: 2021-04-29
Payer: COMMERCIAL

## 2021-04-29 VITALS
HEART RATE: 88 BPM | BODY MASS INDEX: 44.43 KG/M2 | WEIGHT: 300 LBS | HEIGHT: 69 IN | DIASTOLIC BLOOD PRESSURE: 80 MMHG | OXYGEN SATURATION: 99 % | SYSTOLIC BLOOD PRESSURE: 134 MMHG

## 2021-04-29 DIAGNOSIS — Z09 ENCOUNTER FOR FOLLOW-UP EXAMINATION AFTER COMPLETED TREATMENT FOR CONDITIONS OTHER THAN MALIGNANT NEOPLASM: ICD-10-CM

## 2021-04-29 PROCEDURE — 99024 POSTOP FOLLOW-UP VISIT: CPT

## 2021-04-29 NOTE — PHYSICAL EXAM
[Respiratory Effort] : normal respiratory effort [de-identified] : NAD [de-identified] : EOM intact [de-identified] : Abdomen - soft, obese, non-tender.  Umbilical incision with minimal fibrin and trace serous exudate.  No erythema.  Remainder of incisions clean and intact.

## 2021-10-01 ENCOUNTER — EMERGENCY (EMERGENCY)
Facility: HOSPITAL | Age: 36
LOS: 1 days | Discharge: HOME | End: 2021-10-01
Attending: EMERGENCY MEDICINE | Admitting: EMERGENCY MEDICINE
Payer: COMMERCIAL

## 2021-10-01 VITALS
HEIGHT: 72 IN | TEMPERATURE: 98 F | OXYGEN SATURATION: 98 % | SYSTOLIC BLOOD PRESSURE: 144 MMHG | DIASTOLIC BLOOD PRESSURE: 87 MMHG | WEIGHT: 315 LBS | HEART RATE: 76 BPM | RESPIRATION RATE: 20 BRPM

## 2021-10-01 VITALS
OXYGEN SATURATION: 97 % | DIASTOLIC BLOOD PRESSURE: 85 MMHG | HEART RATE: 81 BPM | RESPIRATION RATE: 18 BRPM | SYSTOLIC BLOOD PRESSURE: 151 MMHG | TEMPERATURE: 98 F

## 2021-10-01 LAB
ALBUMIN SERPL ELPH-MCNC: 3.4 G/DL — SIGNIFICANT CHANGE UP (ref 3.3–5)
ALP SERPL-CCNC: 80 U/L — SIGNIFICANT CHANGE UP (ref 40–120)
ALT FLD-CCNC: 56 U/L — SIGNIFICANT CHANGE UP (ref 12–78)
ANION GAP SERPL CALC-SCNC: 5 MMOL/L — SIGNIFICANT CHANGE UP (ref 5–17)
APPEARANCE UR: CLEAR — SIGNIFICANT CHANGE UP
AST SERPL-CCNC: 37 U/L — SIGNIFICANT CHANGE UP (ref 15–37)
BACTERIA # UR AUTO: ABNORMAL
BASOPHILS # BLD AUTO: 0.03 K/UL — SIGNIFICANT CHANGE UP (ref 0–0.2)
BASOPHILS NFR BLD AUTO: 0.3 % — SIGNIFICANT CHANGE UP (ref 0–2)
BILIRUB SERPL-MCNC: 0.5 MG/DL — SIGNIFICANT CHANGE UP (ref 0.2–1.2)
BILIRUB UR-MCNC: NEGATIVE — SIGNIFICANT CHANGE UP
BUN SERPL-MCNC: 17 MG/DL — SIGNIFICANT CHANGE UP (ref 7–23)
CALCIUM SERPL-MCNC: 8.9 MG/DL — SIGNIFICANT CHANGE UP (ref 8.5–10.1)
CHLORIDE SERPL-SCNC: 105 MMOL/L — SIGNIFICANT CHANGE UP (ref 96–108)
CO2 SERPL-SCNC: 26 MMOL/L — SIGNIFICANT CHANGE UP (ref 22–31)
COLOR SPEC: YELLOW — SIGNIFICANT CHANGE UP
COMMENT - URINE: SIGNIFICANT CHANGE UP
CREAT SERPL-MCNC: 1 MG/DL — SIGNIFICANT CHANGE UP (ref 0.5–1.3)
DIFF PNL FLD: ABNORMAL
EOSINOPHIL # BLD AUTO: 0.03 K/UL — SIGNIFICANT CHANGE UP (ref 0–0.5)
EOSINOPHIL NFR BLD AUTO: 0.3 % — SIGNIFICANT CHANGE UP (ref 0–6)
EPI CELLS # UR: ABNORMAL
GLUCOSE SERPL-MCNC: 97 MG/DL — SIGNIFICANT CHANGE UP (ref 70–99)
GLUCOSE UR QL: NEGATIVE — SIGNIFICANT CHANGE UP
HCT VFR BLD CALC: 43.8 % — SIGNIFICANT CHANGE UP (ref 39–50)
HGB BLD-MCNC: 14 G/DL — SIGNIFICANT CHANGE UP (ref 13–17)
IMM GRANULOCYTES NFR BLD AUTO: 0.3 % — SIGNIFICANT CHANGE UP (ref 0–1.5)
KETONES UR-MCNC: NEGATIVE — SIGNIFICANT CHANGE UP
LEUKOCYTE ESTERASE UR-ACNC: ABNORMAL
LYMPHOCYTES # BLD AUTO: 1.65 K/UL — SIGNIFICANT CHANGE UP (ref 1–3.3)
LYMPHOCYTES # BLD AUTO: 17.8 % — SIGNIFICANT CHANGE UP (ref 13–44)
MCHC RBC-ENTMCNC: 26.7 PG — LOW (ref 27–34)
MCHC RBC-ENTMCNC: 32 GM/DL — SIGNIFICANT CHANGE UP (ref 32–36)
MCV RBC AUTO: 83.6 FL — SIGNIFICANT CHANGE UP (ref 80–100)
MONOCYTES # BLD AUTO: 0.59 K/UL — SIGNIFICANT CHANGE UP (ref 0–0.9)
MONOCYTES NFR BLD AUTO: 6.4 % — SIGNIFICANT CHANGE UP (ref 2–14)
NEUTROPHILS # BLD AUTO: 6.96 K/UL — SIGNIFICANT CHANGE UP (ref 1.8–7.4)
NEUTROPHILS NFR BLD AUTO: 74.9 % — SIGNIFICANT CHANGE UP (ref 43–77)
NITRITE UR-MCNC: NEGATIVE — SIGNIFICANT CHANGE UP
NRBC # BLD: 0 /100 WBCS — SIGNIFICANT CHANGE UP (ref 0–0)
PH UR: 5 — SIGNIFICANT CHANGE UP (ref 5–8)
PLATELET # BLD AUTO: 297 K/UL — SIGNIFICANT CHANGE UP (ref 150–400)
POTASSIUM SERPL-MCNC: 4.4 MMOL/L — SIGNIFICANT CHANGE UP (ref 3.5–5.3)
POTASSIUM SERPL-SCNC: 4.4 MMOL/L — SIGNIFICANT CHANGE UP (ref 3.5–5.3)
PROT SERPL-MCNC: 8.7 G/DL — HIGH (ref 6–8.3)
PROT UR-MCNC: NEGATIVE — SIGNIFICANT CHANGE UP
RBC # BLD: 5.24 M/UL — SIGNIFICANT CHANGE UP (ref 4.2–5.8)
RBC # FLD: 14.1 % — SIGNIFICANT CHANGE UP (ref 10.3–14.5)
RBC CASTS # UR COMP ASSIST: ABNORMAL /HPF (ref 0–4)
SODIUM SERPL-SCNC: 136 MMOL/L — SIGNIFICANT CHANGE UP (ref 135–145)
SP GR SPEC: 1.02 — SIGNIFICANT CHANGE UP (ref 1.01–1.02)
UROBILINOGEN FLD QL: NEGATIVE — SIGNIFICANT CHANGE UP
WBC # BLD: 9.29 K/UL — SIGNIFICANT CHANGE UP (ref 3.8–10.5)
WBC # FLD AUTO: 9.29 K/UL — SIGNIFICANT CHANGE UP (ref 3.8–10.5)
WBC UR QL: SIGNIFICANT CHANGE UP

## 2021-10-01 PROCEDURE — 81001 URINALYSIS AUTO W/SCOPE: CPT

## 2021-10-01 PROCEDURE — 96374 THER/PROPH/DIAG INJ IV PUSH: CPT

## 2021-10-01 PROCEDURE — 85025 COMPLETE CBC W/AUTO DIFF WBC: CPT

## 2021-10-01 PROCEDURE — 74176 CT ABD & PELVIS W/O CONTRAST: CPT | Mod: 26,ME

## 2021-10-01 PROCEDURE — G1004: CPT

## 2021-10-01 PROCEDURE — 99284 EMERGENCY DEPT VISIT MOD MDM: CPT | Mod: 25

## 2021-10-01 PROCEDURE — 99285 EMERGENCY DEPT VISIT HI MDM: CPT

## 2021-10-01 PROCEDURE — 80053 COMPREHEN METABOLIC PANEL: CPT

## 2021-10-01 PROCEDURE — 74176 CT ABD & PELVIS W/O CONTRAST: CPT | Mod: ME

## 2021-10-01 PROCEDURE — 36415 COLL VENOUS BLD VENIPUNCTURE: CPT

## 2021-10-01 RX ORDER — TAMSULOSIN HYDROCHLORIDE 0.4 MG/1
0.4 CAPSULE ORAL AT BEDTIME
Refills: 0 | Status: DISCONTINUED | OUTPATIENT
Start: 2021-10-01 | End: 2021-10-04

## 2021-10-01 RX ORDER — SODIUM CHLORIDE 9 MG/ML
1000 INJECTION INTRAMUSCULAR; INTRAVENOUS; SUBCUTANEOUS ONCE
Refills: 0 | Status: COMPLETED | OUTPATIENT
Start: 2021-10-01 | End: 2021-10-01

## 2021-10-01 RX ORDER — TAMSULOSIN HYDROCHLORIDE 0.4 MG/1
1 CAPSULE ORAL
Qty: 10 | Refills: 0
Start: 2021-10-01 | End: 2021-10-10

## 2021-10-01 RX ORDER — KETOROLAC TROMETHAMINE 30 MG/ML
30 SYRINGE (ML) INJECTION ONCE
Refills: 0 | Status: DISCONTINUED | OUTPATIENT
Start: 2021-10-01 | End: 2021-10-01

## 2021-10-01 RX ORDER — KETOROLAC TROMETHAMINE 30 MG/ML
1 SYRINGE (ML) INJECTION
Qty: 15 | Refills: 0
Start: 2021-10-01 | End: 2021-10-05

## 2021-10-01 RX ADMIN — Medication 30 MILLIGRAM(S): at 04:01

## 2021-10-01 RX ADMIN — SODIUM CHLORIDE 1000 MILLILITER(S): 9 INJECTION INTRAMUSCULAR; INTRAVENOUS; SUBCUTANEOUS at 03:31

## 2021-10-01 RX ADMIN — TAMSULOSIN HYDROCHLORIDE 0.4 MILLIGRAM(S): 0.4 CAPSULE ORAL at 05:13

## 2021-10-01 RX ADMIN — Medication 30 MILLIGRAM(S): at 03:31

## 2021-10-01 RX ADMIN — SODIUM CHLORIDE 1000 MILLILITER(S): 9 INJECTION INTRAMUSCULAR; INTRAVENOUS; SUBCUTANEOUS at 04:30

## 2021-10-01 NOTE — ED PROVIDER NOTE - OBJECTIVE STATEMENT
35yo male who presents with flank pain tonite, pt had sudden onset of right flank pain, constant non radiating no nausea or vomiting no fever, chills, pt took something at work but does not know the name of it, pt has hx of kidney stone

## 2021-10-01 NOTE — ED PROVIDER NOTE - PATIENT PORTAL LINK FT
You can access the FollowMyHealth Patient Portal offered by [facility] by registering at the following website: [email or web address]. By joining [employer] [facility]’s FollowMyHealth portal, you will also be able to view your health information using other applications (apps) compatible with our system.

## 2021-10-01 NOTE — ED ADULT NURSE NOTE - NSIMPLEMENTINTERV_GEN_ALL_ED
Implemented All Universal Safety Interventions:  [place] to call system. Call bell, personal items and telephone within reach. Instruct patient to call for assistance. Room bathroom lighting operational. Non-slip footwear when patient is off stretcher. Physically safe environment: no spills, clutter or unnecessary equipment. Stretcher in lowest position, wheels locked, appropriate side rails in place.

## 2021-10-01 NOTE — ED ADULT NURSE NOTE - OBJECTIVE STATEMENT
Patient came in to ED c/o right flank pain since yesterday afternoon. Patient states he took some pain meds but no improvement that prompted to come to ED. Patient also states he had kidney stone in the past. Denies burning on urination/ nausea/ vomiting/ fever.

## 2021-10-01 NOTE — ED ADULT NURSE NOTE - CAS TRG GENERAL NORM CIRC DET
Medicare Wellness Visit  Plan for Preventive Care    A good way for you to stay healthy is to use preventive care.  Medicare covers many services that can help you stay healthy.* The goal of these services is to find any health problems as quickly as possible. Finding problems early can help make them easier to treat.  Your personal plan below lists the services you may need and when they are due.     Health Maintenance Summary     DM/CKD Microalbumin (Yearly)  Overdue since 10[birth year]18/1953    Medicare Wellness 65+ (Yearly)  Due since 12/18/2019    Depression Screening (Yearly)  Due since 12/18/2019    DTaP/Tdap/Td Vaccine (1 - Tdap)  Postponed until 4/30/2020    Shingles Vaccine (1 of 2)  Postponed until 10/15/2020    DM/CKD GFR (Yearly)  Next due on 12/14/2020    Osteoporosis Screening   Completed    Pneumococcal Vaccine 65+   Completed    Influenza Vaccine   Completed    Meningococcal Vaccine   Aged Out           Preventive Care for Women and Men    Heart Screenings (Cardiovascular):  · Blood tests are used to check your cholesterol, lipid and triglyceride levels. High levels can increase your risk for heart disease and stroke. High levels can be treated with medications, diet and exercise. Lowering your levels can help keep your heart and blood vessels healthy.  Your provider will order these tests if they are needed.    · An ultrasound is done to see if you have an abdominal aortic aneurysm (AAA).  This is an enlargement of one of the main blood vessels that delivers blood to the body.   In the [place], 9,000 deaths are caused by AAA.  You may not even know you have this problem and as many as 1 in 3 people will have a serious problem if it is not treated.  Early diagnosis allows for more effective treatment and cure.  If you have a family history of AAA or are a male age 65-75 who has smoked, you are at higher risk of an AAA.  Your provider can order this test, if needed.    Colorectal Screening:  · There  are many tests that are used to check for cancer of your colon and rectum. You and your provider should discuss what test is best for you and when to have it done.  Options include:  · Screening Colonoscopy: exam of the entire colon, seen through a flexible lighted tube.  · Flexible Sigmoidoscopy: exam of the last third (sigmoid portion) of the colon and rectum, seen through a flexible lighted tube.  · Cologuard DNA stool test: a sample of your stool is used to screen for cancer and unseen blood in your stool.  · Fecal Occult Blood Test: a sample of your stool is studied to find any unseen blood    Flu Shot:  · An immunization that helps to prevent influenza (the flu). You should get this every year. The best time to get the shot is in the fall.    Pneumococcal Shot:  • Vaccines are available that can help prevent pneumococcal disease, which is any type of infection caused by Streptococcus pneumoniae bacteria.   Their use can prevent some cases of pneumonia, meningitis, and sepsis. There are two types of pneumococcal vaccines:   o Conjugate vaccines (PCV-13 or Prevnar 13®) - helps protect against the 13 types of pneumococcal bacteria that are the most common causes of serious infections in children and adults.    o Polysaccharide vaccine (PPSV23 or Pfehreqmu12®) - helps protect against 23 types of pneumococcal bacteria for patients who are recommended to get it.  These vaccines should be given at least 12 months apart.  A booster is usually not needed.     Hepatitis B Shot:  · An immunization that helps to protect people from getting Hepatitis B. Hepatitis B is a virus that spreads through contact with infected blood or body fluids. Many people with the virus do not have symptoms.  The virus can lead to serious problems, such as liver disease. Some people are at higher risk than others. Your doctor will tell you if you need this shot.     Diabetes Screening:  · A test to measure sugar (glucose) in your blood is  called a fasting blood sugar. Fasting means you cannot have food or drink for at least 8 hours before the test. This test can detect diabetes long before you may notice symptoms.    Glaucoma Screening:  · Glaucoma screening is performed by your eye doctor. The test measures the fluid pressure inside your eyes to determine if you have glaucoma.     Hepatitis C Screening:  · A blood test to see if you have the hepatitis C virus.  Hepatitis C attacks the liver and is a major cause of chronic liver disease.  Medicare will cover a single screening for all adults born between 1945 & 1965, or high risk patients (people who have injected illegal drugs or people who have had blood transfusions).  High risk patients who continue to inject illegal drugs can be screened for Hepatitis C every year.    Smoking and Tobacco-Use Cessation Counseling:  · Tobacco is the single greatest cause of disease and early death in our country today. Medication and counseling together can increase a person’s chance of quitting for good.   · Medicare covers two quitting attempts per year, with four counseling sessions per attempt (eight sessions in a 12 month period)    Preventive Screening tests for Women    Screening Mammograms and Breast Exams:  · An x-ray of your breasts to check for breast cancer before you or your doctor may be able to feel it.  If breast cancer is found early it can usually be treated with success.    Pelvic Exams and Pap Tests:  · An exam to check for cervical and vaginal cancer. A Pap test is a lab test in which cells are taken from your cervix and sent to the lab to look for signs of cervical cancer. If cancer of the cervix is found early, chances for a cure are good. Testing can generally end at age 65, or if a woman has a hysterectomy for a benign condition. Your provider may recommend more frequent testing if certain abnormal results are found.    Bone Mass Measurements:  · A painless x-ray of your bone density to  see if you are at risk for a broken bone. Bone density refers to the thickness of bones or how tightly the bone tissue is packed.    Preventive Screening tests for Men    Prostate Screening:  · Should you have a prostate cancer test (PSA)?  It is up to you to decide if you want a prostate cancer test. Talk to your clinician to find out if the test is right for you.  Things for you to consider and talk about should include:  · Benefits and harms of the test  · Your family history  · How your race/ethnicity may influence the test  · If the test may impact other medical conditions you have  · Your values on screenings and treatments    *Medicare pays for many preventive services to keep you healthy. For some of these services, you might have to pay a deductible, coinsurance, and / or copayment.  The amounts vary depending on the type of services you need and the kind of Medicare health plan you have.               Strong peripheral pulses

## 2021-10-01 NOTE — ED PROVIDER NOTE - CARE PROVIDER_API CALL
Alberto Mari)  Urology  [address], [address]  [place] [address]  Phone: [phone]  Fax: [phone]  Follow Up Time:

## 2024-10-22 ENCOUNTER — EMERGENCY (EMERGENCY)
Facility: HOSPITAL | Age: 39
LOS: 1 days | Discharge: HOME | End: 2024-10-22
Attending: EMERGENCY MEDICINE | Admitting: EMERGENCY MEDICINE
Payer: COMMERCIAL

## 2024-10-22 VITALS
HEIGHT: 72 IN | SYSTOLIC BLOOD PRESSURE: 127 MMHG | TEMPERATURE: 99 F | OXYGEN SATURATION: 97 % | WEIGHT: 315 LBS | HEART RATE: 84 BPM | RESPIRATION RATE: 20 BRPM | DIASTOLIC BLOOD PRESSURE: 86 MMHG

## 2024-10-22 PROCEDURE — 99284 EMERGENCY DEPT VISIT MOD MDM: CPT

## 2024-10-22 RX ORDER — OXYCODONE AND ACETAMINOPHEN 5; 325 MG/1; MG/1
1 TABLET ORAL
Qty: 8 | Refills: 0
Start: 2024-10-22

## 2024-10-22 RX ORDER — CYCLOBENZAPRINE HCL 10 MG
1 TABLET ORAL
Qty: 10 | Refills: 0
Start: 2024-10-22

## 2024-10-22 RX ADMIN — Medication 500 MILLIGRAM(S): at 09:55

## 2024-10-22 NOTE — ED PROVIDER NOTE - PROVIDER TOKENS
PROVIDER:[TOKEN:[[id number]]],PROVIDER:[TOKEN:[[id number]]],PROVIDER:[TOKEN:[[id number]]]

## 2024-10-22 NOTE — ED PROVIDER NOTE - PHYSICAL EXAMINATION
No midline spinal tenderness in cervical, thoracic, or lumbar.  Positive bilateral upper lumbar paraspinal mild tenderness with mild spasm.  Neuro:   AAOx3, Cranial nerves 2-12 intact, central and peripheral vision intact, extra-ocular movements intact.  Neck: normal. No signs of tenderness or edema. Speech: clear, Gait and Weight Bearing: normal, Deep Tendon Reflexes: normal, 2+ reflexes (PT, Knee), Sensation: present and normal in 4 extremities, Coordination: normal, Pronator Drift: none, Straight Leg Raise: normal bilaterally with equal strength bl, Motor: normal. Nl strength (5/5) equal bl x 4, Posturing: normal, Normal saddle sensation.

## 2024-10-22 NOTE — ED PROVIDER NOTE - OBJECTIVE STATEMENT
39-year-old male with no significant past medical history presents with having some lower back pain over the past 4 to 5 days.  The patient states that he has been sleeping on an uneven/broken bed, which seems to have exacerbated this issue.  No numbness or tingling.  No radiation of pain to the arms or legs.  No focal weakness.  No change in bowel or bladder.  No aggravating or alleviating factors otherwise noted.  Some improvement with muscle relaxant from his mother.  No dysuria/hematuria.  No cough/URI.  No recent fall.  No recent travel or heavy lifting.  No other acute complaints at this time.  Pt with no history of metastatic disease, unexplained weight loss, fever or night sweats. Pt with no hx immunocompromise disease, HIV, prolonged steroid use, IVDA.  No history of recent infections. No history of aortic disease / aneurysms. No history of urinary retention, bowel incontinence or saddle anesthesia. No apparent history of "red flags" to account for patients low back pain.

## 2024-10-22 NOTE — ED PROVIDER NOTE - CLINICAL SUMMARY MEDICAL DECISION MAKING FREE TEXT BOX
Acute low back pain without sciatica, neurologically intact.  P.o. meds, outpatient orthopedic follow-up

## 2024-10-22 NOTE — ED PROVIDER NOTE - PROGRESS NOTE DETAILS
Patient doing well, no acute complaints. Discussed with patient about the nature of the back pain and the importance of outpatient follow-up for continued workup, evaluation and definitive diagnosis.  Discussed back pain and neurologic precautions including numbness, tingling, weakness, fever, and changes in bowel/bladder.  An opportunity to ask questions was given . Understanding of all instructions and precautions was verbalized and the patient will follow-up as outpatient as soon as possible. Patient also understands the possibility of needing additional imaging, ie MRI as outpatient. Patient will return with any changes, concerns, or any worsening / persistent symptoms.

## 2024-10-22 NOTE — ED PROVIDER NOTE - PATIENT PORTAL LINK FT
You can access the FollowMyHealth Patient Portal offered by [facility] by registering at the following website: [email or web address]. By joining [employer]’s FollowMyHealth portal, you will also be able to view your health information using other applications (apps) compatible with our system.

## 2024-10-22 NOTE — ED ADULT TRIAGE NOTE - CHIEF COMPLAINT QUOTE
lower back pain since Thursday night, noticed my bed frame was cracked and I am guessing that is the culprit.  No known injury otherwise.  no radiating pain down legs.  no bowel / bladder issues.  pain, rated 8/10 is across lower back.  h/o prior issues, but no significant injury  (I took a Skelaxin early this morning which I had at home)

## 2024-10-22 NOTE — ED PROVIDER NOTE - NSFOLLOWUPINSTRUCTIONS_ED_ALL_ED_FT
1.  Follow-up with your Primary Medical Doctor or referred doctor. Call today / next business day for prompt follow-up.  2.  With back pain, whether it is a new pain or a chronic pain, it is very important to have close, prompt outpatient follow-up for continued workup, evaluation and definitive diagnosis.  If you develop worsening or persistent pain, any numbness, tingling, weakness of one or both of your legs, any fever, or any changes / difficulty urinating then you will need to see your back doctor immediately or you can return to the emergency room.  Many times your doctor will need to set you up for further imaging / testing such as an MRI.  3.  See attached instruction sheets for additional information, including information regarding signs and symptoms to look out for, reasons to seek immediate care and other important instructions.  4.  Follow-up with your Orthopedist or Neurosurgeon as soon as possible. If you do not have one then you will need to call the referred doctor as soon as possible (today / next business day) for prompt follow-up for further workup and treatment. See the referred doctor for information.  5.  Naproxen every 12 hours as needed, with food  6.  Flexeril as needed for spasm, No driving or operating machinery while taking  7.  Percocet as needed for moderate pain, no driving or operating machinery.

## 2024-10-22 NOTE — ED PROVIDER NOTE - CARE PROVIDERS DIRECT ADDRESSES
[email or web address]

## 2024-10-22 NOTE — ED PROVIDER NOTE - CARE PROVIDER_API CALL
Lisette Ramirez  Internal Medicine  [address] [place] [id number]  Phone: [phone]  Fax: [phone]  Follow Up Time:     Foster Amezcua  Orthopaedic Surgery  [address] [facility], [facility] [[email or web address]] [address]  [place] [address]  Phone: [phone]  Fax: [phone]  Follow Up Time:     Cas Poe  Orthopaedic Surgery  [address] [facility] [address]  [place] [address]  Phone: [phone]  Fax: [phone]  Follow Up Time:

## 2025-05-08 ENCOUNTER — EMERGENCY (EMERGENCY)
Facility: HOSPITAL | Age: 40
LOS: 1 days | End: 2025-05-08
Attending: STUDENT IN AN ORGANIZED HEALTH CARE EDUCATION/TRAINING PROGRAM | Admitting: STUDENT IN AN ORGANIZED HEALTH CARE EDUCATION/TRAINING PROGRAM
Payer: COMMERCIAL

## 2025-05-08 VITALS
RESPIRATION RATE: 18 BRPM | SYSTOLIC BLOOD PRESSURE: 134 MMHG | OXYGEN SATURATION: 97 % | HEART RATE: 84 BPM | WEIGHT: 315 LBS | HEIGHT: 72 IN | DIASTOLIC BLOOD PRESSURE: 80 MMHG | TEMPERATURE: 99 F

## 2025-05-08 PROCEDURE — 99284 EMERGENCY DEPT VISIT MOD MDM: CPT

## 2025-05-08 RX ORDER — ONDANSETRON HCL/PF 4 MG/2 ML
4 VIAL (ML) INJECTION ONCE
Refills: 0 | Status: DISCONTINUED | OUTPATIENT
Start: 2025-05-08 | End: 2025-05-12

## 2025-05-08 RX ORDER — ACETAMINOPHEN 500 MG/5ML
1000 LIQUID (ML) ORAL ONCE
Refills: 0 | Status: COMPLETED | OUTPATIENT
Start: 2025-05-08 | End: 2025-05-08

## 2025-05-08 NOTE — ED PROVIDER NOTE - PHYSICAL EXAMINATION
Gen: Awake, Alert, NAD  Head:  NC/AT  Eyes:  PERRL, EOMI, Conjunctiva pink, no scleral icterus  Cardiac/CV:  S1 S2, RRR, no murmurs  Respiratory/Pulm:  CTAB, good air movement, normal resp effort, no wheezes/stridor/retractions/rales/rhonchi  Gastrointestinal/Abdomen:  Soft, nontender, nondistended, no rebound/guarding  Ext:  warm, well perfused, moving all extremities spontaneously, no edema, distal pulses intact  Skin: intact, no rash, no ecchymosis  Neuro:  AAOx3, sensation intact, motor 5/5 x 4 extremities, clear speech

## 2025-05-08 NOTE — ED ADULT TRIAGE NOTE - CHIEF COMPLAINT QUOTE
I started having R flank/back pain about 1-2 hours ago w/ one episode vomiting. Pt w/ hx renal stones. Reports pain has slightly subsided.

## 2025-05-08 NOTE — ED PROVIDER NOTE - NSFOLLOWUPINSTRUCTIONS_ED_ALL_ED_FT
Please return to the Emergency Department immediately for worsening pain, fevers, vomiting and if you have any other problems or concerns. Please follow up with your Primary Care Physician within the next 2 days.    Please take any prescription medications prescribed as instructed    Please follow up with urology within the next 2 days as well    If you do not have a physician or have difficulty following up, please call: [phone] to obtain a [facility] doctor or specialist who can provide follow up.        KIDNEY STONES - Ambulatory Care    Kidney Stones    AMBULATORY CARE:    Kidney stones form in the urinary system when the water and waste in your urine are out of balance. When this happens, certain types of waste crystals separate from the urine. The crystals build up and form kidney stones. Kidney stones can be made of uric acid, calcium, phosphate, or oxalate crystals. You may have 1 or more kidney stones.  Kidney Stones     Common symptoms include the following:    Pain in the middle of your back that moves across to your side or that may spread to your groin    Nausea and vomiting    Urge to urinate often, burning feeling when you urinate, or pink or red urine    Tenderness in your lower back, side, or stomach  Seek care immediately if:    You are vomiting and it is not relieved with medicine.    Call your doctor or kidney specialist if:    You have a fever.    You have trouble urinating.    You see blood in your urine.    You have severe pain.    You have any questions or concerns about your condition or care.  Treatment for kidney stones may include any of the following:    Medicines may be used to prevent or relieve pain or to balance your electrolytes.    A procedure or surgery to remove the kidney stones may be needed if they do not pass on their own. Your treatment will depend on the size and location of your kidney stones.  What you can do to manage kidney stones:    Drink more liquids. Your healthcare provider may tell you to drink at least 8 to 12 (eight-ounce) cups of liquids each day. This helps flush out the kidney stones when you urinate. Water is the best liquid to drink.    Strain your urine every time you go to the bathroom. Urinate through a strainer or a piece of thin cloth to catch the stones. Take the stones to your healthcare provider so they can be sent to the lab for tests. This will help your healthcare providers plan the best treatment for you.  Look for Stones in the Filter      Ask if you should avoid any foods. You may need to limit oxalate. Oxalate is a chemical found in some plant foods. The most common type of kidney stone is made up of crystals that contain calcium and oxalate. Your healthcare provider or dietitian may recommend that you limit oxalate if you get this type of kidney stone often. You may need to limit how much sodium (salt) or protein you eat. Ask for information about the best foods for you.  High Oxalate Foods      Be physically active as directed. Your stones may pass more easily if you stay active. Physical activity can also help you manage your weight. Ask about the best activities for you.  Diverse Family Walking for Exercise  After you pass the kidney stones: Your healthcare provider may order a 24-hour urine test. Results from a 24-hour urine test will help your healthcare provider plan ways to prevent more stones from forming. Your healthcare provider will give you more instructions.    Follow up with your doctor or kidney specialist as directed: Write down your questions so you remember to ask them during your visits.

## 2025-05-08 NOTE — ED PROVIDER NOTE - OBJECTIVE STATEMENT
39M with PMH of previous kidney stones and cholecystectomy who presents with right flank pain. patient reports waking up 1-2 hours ago with right flank pain, severe initially with 1x vomiting similar to previous stones. currently pain largely subsided. no stones in years, never needed intervention. denies fevers, hematuria

## 2025-05-08 NOTE — ED PROVIDER NOTE - CARE PROVIDER_API CALL
Ryan Santana  Urology  [address] [facility], [address]  [place] [address]  Phone: [phone]  Fax: [phone]  Follow Up Time:

## 2025-05-08 NOTE — ED PROVIDER NOTE - PATIENT PORTAL LINK FT
You can access the FollowMyHealth Patient Portal offered by [facility] by registering at the following website: [email or web address]. By joining [employer]’s FollowMyHealth portal, you will also be able to view your health information using other applications (apps) compatible with our system.

## 2025-05-08 NOTE — ED PROVIDER NOTE - PROGRESS NOTE DETAILS
Patient was re-evaluated at this time and they are feeling much better. remains without pain, he also states he think he passed something when he went to give urine sample, possibly just passed a small stone. currently no longer has any pain. UA shows some blood, questionable bacteria, will empirically cover with abx. The Patient will be discharged home at this time. Their lab and/or imaging results were explained with the patient and they were instructed to go over them with their PCP. All questions were answered at this time.     Patient was told to follow up with their PCP within the next 2 days. they were told to return to the ED if they have worsening pain, fevers, vomiting    patient will be discharged home at this time, they are in agreement with the plan

## 2025-05-08 NOTE — ED ADULT TRIAGE NOTE - AS TEMP SITE
Chronic Pain Clinic Note     Encounter Date: 2/24/21    Subjective:   Chief Complaint:   Chief Complaint   Patient presents with    Follow-up       History of Present Illness:   Mari Kwok is a 53 y.o. male seen in the clinic initially on 01/21/21 upon request from David Hong, [address] ([address] [place] [address]) for his history of lower limb spasticity. He underwent an arachnoid cyst resection with Dr. Yary Kapoor on May 14, 2020 after worsening bilateral leg pain. He notes about 2 months after his surgery his legs have been increasingly worse. He describes his pain to be in bilateral legs from the waist down that he describes as a constant 10/10 pain. He states the pain is a constant aching, burning pain. It is worse with any ambulation. He states is pain is improved with rest. He uses a walker for ambulation due to pain and weakness in his legs. He has been taking Baclofen 10 mg twice a day which does not help too much. He denies any saddle anesthesia or bowel/bladder incontinence. He deals with a lot of tone/spasticity in his legs. He reports stretching about 4 times a day. He was previously working at [employer] prior to his surgery. Today, 2/24/2021, patient presents for plan follow-up of lower limb spasticity and leg pain. He reports significant provement in his leg pain with the addition of the Lyrica. He denies any side effects on this medication. He feels like he is doing ultimately well with the baclofen at 10 mg 3 times a day. He feels like he is ambulating little bit easier. He states he is stretching 45 times a day. He states he is currently in the process of getting Medicaid and his disability paperwork.   He denies any symptoms of focal leg weakness, saddle anesthesia, or bowel/bladder incontinence      History of Interventions:   Surgery: arachnoid cyst resection - Dr. Tiffani Breen (5/14/20)  Injections: None    Current Treatment Medications:   Baclofen 10 mg 3 times daily  Lyrica 75 mg twice daily Historical Treatment Medications:   None      Past Medical History:   Diagnosis Date    Asthma     does not know name of inhaler and hasnt used in over a yr    Ataxic gait     Developmental delay     [facility]-controlled diabetes mellitus ([facility]     History of syrinx of spinal cord ([facility] 03/17/2020    Incomplete bladder emptying     Myelopathy of thoracic region     Osteopenia determined by x-ray     Spasm of muscle     Urinary dribbling     Urinary leakage     Uses walker     for standing and transfers    Uses wheelchair     can stand and transfer cannot walk due to leg weakness    Vitamin D deficiency 03/12/2020    Vitamin D 25OH 24       Past Surgical History:   Procedure Laterality Date    CRANIOTOMY N/A 5/14/2020    Thoracic Spine and Laminectomy Extraction of Arachnoid Cyst/Band and Resection of Possible Spinal Fluid Dural Fistula performed by Kamilla Godoy MD at [address] [place] [address]  09/16/2020    IR angiogram carotid cerebral bilateral       Family History   Problem Relation Age of Onset    Arthritis Mother     Diabetes Mother     Heart Disease Father     Cancer Neg Hx     High Blood Pressure Neg Hx     Stroke Neg Hx        Social History     Socioeconomic History    Marital status: Single     Spouse name: Not on file    Number of children: Not on file    Years of education: Not on file    Highest education level: Not on file   Occupational History    Not on file   Social Needs    Financial resource strain: Not on file    Food insecurity     Worry: Not on file     Inability: Not on file    Transportation needs     Medical: Not on file     Non-medical: Not on file   Tobacco Use    Smoking status: Never Smoker    Smokeless tobacco: Never Used   Substance and Sexual Activity    Alcohol use: No    Drug use: No    Sexual activity: Not Currently   Lifestyle    Physical activity     Days per week: Not on file     Minutes per session: Not on file    Stress: Not on file Relationships    Social connections     Talks on phone: Not on file     Gets together: Not on file     Attends [employer] service: Not on file     Active member of club or organization: Not on file     Attends meetings of clubs or organizations: Not on file     Relationship status: Not on file    Intimate partner violence     Fear of current or ex partner: Not on file     Emotionally abused: Not on file     Physically abused: Not on file     Forced sexual activity: Not on file   Other Topics Concern    Not on file   Social History Narrative    Not on file       Medications & Allergies:   Current Outpatient Medications   Medication Instructions    acetaminophen (TYLENOL) 500 mg, Oral, EVERY 6 HOURS PRN    baclofen (LIORESAL) 20 mg, Oral, 3 TIMES DAILY    ibuprofen (ADVIL;MOTRIN) 600 mg, Oral, 3 TIMES DAILY PRN    pregabalin (LYRICA) 75 mg, Oral, 2 TIMES DAILY    vitamin D-3 (CHOLECALCIFEROL) 5,000 Units, Oral, DAILY       Allergies   Allergen Reactions    Norco [Hydrocodone-Acetaminophen] Nausea And Vomiting       Review of Systems:   Constitutional: negative for weight changes or fevers  Genitourinary: negative for bowel/bladder incontinence   Musculoskeletal: positive for leg pain  Neurological: admits to leg weakness   Behavioral/Psych: negative for anxiety/depression   All other systems reviewed and are negative    Objective:     Vitals:    02/24/21 1540   BP: (!) 136/96   Temp: 97.5 °F (36.4 °C)       Constitutional: Pleasant, no acute distress   Head: Normocephalic, atraumatic   Eyes: Conjunctivae normal   Neck: Supple, symmetrical   Lungs: Normal respiratory effort, non-labored breathing   Cardiovascular: Limbs warm and well perfused   Abdomen: Non-protruded   Musculoskeletal: Muscle bulk atrophy in lower limbs but no gross deformities   · Lumbar Spine: Lumbar paraspinals tender bilaterally. SLR neg bilaterally. Neurological: Cranial nerves II-XII grossly intact. · Gait - Spastic gait.  Ambulates with assist device. · Motor: 4/5 motor strength in bilateral HF, KE, KF, ADF, APF  · Sensory: LT sensation diminished in lower limbs diffusely  · Reflexes: hyper-reflexia in bilateral achilles and bilateral patellar reflexes, sustained ankle clonus b/l  Skin: No rashes or lesions visible in lower limbs  Psychological: Cooperative, no exaggerated pain behaviors       Assessment:    Diagnosis Orders   1. Spasticity     2. Neuropathic pain  pregabalin (LYRICA) 75 MG capsule   3. Other chronic pain     4. Syrinx of spinal cord ([address]         Ksenia Barry is a 53 y.o.male presenting to the pain clinic for evaluation of lower limb spasticity. At this point, we will increase his baclofen to 10 mg 3 times a day and likely will have to be up to 20 mg 3 times a day to help with the spasticity in the lower limbs. He has obtained significant benefit for his neuropathic pain coverage with Lyrica 75 mg twice a day. I have continue this medication. In addition I have increased his baclofen to 20 mg 3 times a day as he is seeing improvement with the spasticity. Plan: The following treatment recommendations and plan were discussed in detail with Ksenia Barry. Adjuvants: In light of the presence of a neuropathic component of pain, we will continue Lyrica at 75 mg twice a day. For continued chronic pain with associated musculoskeletal component, we will continue his Baclofen to 20 mg three times a day. Multidisciplinary Pain Management:   In the presence of complex, chronic, and multi-factorial pain, the importance of a multidisciplinary approach to pain management in the patients management regimen was emphasized and discussed in great detail. PHYSICAL THERAPY: I advised the patient to continue gentle stretching atleast 4 times a day.     Referrals:  None    Prescriptions Written This Visit:   Baclofen 20 mg   Lyrica 75 mg     Follow-up: 4 weeks      Jake Person DO  Interventional Pain Management/PM&R   [place] [facility]

## 2025-05-08 NOTE — ED PROVIDER NOTE - CLINICAL SUMMARY MEDICAL DECISION MAKING FREE TEXT BOX
39M with PMH of previous kidney stones and cholecystectomy who presents with right flank pain. patient reports waking up 1-2 hours ago with right flank pain, severe initially with 1x vomiting similar to previous stones. currently pain largely subsided. no stones in years, never needed intervention. denies fevers, hematuria    plan for labs, urine, imaging, medications, reassess

## 2025-05-09 VITALS
RESPIRATION RATE: 17 BRPM | HEART RATE: 75 BPM | SYSTOLIC BLOOD PRESSURE: 149 MMHG | DIASTOLIC BLOOD PRESSURE: 81 MMHG | TEMPERATURE: 98 F | OXYGEN SATURATION: 99 %

## 2025-05-09 LAB
ALBUMIN SERPL ELPH-MCNC: 3 G/DL — LOW (ref 3.3–5)
ALP SERPL-CCNC: 82 U/L — SIGNIFICANT CHANGE UP (ref 40–120)
ALT FLD-CCNC: 31 U/L — SIGNIFICANT CHANGE UP (ref 12–78)
ANION GAP SERPL CALC-SCNC: 9 MMOL/L — SIGNIFICANT CHANGE UP (ref 5–17)
APPEARANCE UR: CLEAR — SIGNIFICANT CHANGE UP
AST SERPL-CCNC: 20 U/L — SIGNIFICANT CHANGE UP (ref 15–37)
BASOPHILS # BLD AUTO: 0.05 K/UL — SIGNIFICANT CHANGE UP (ref 0–0.2)
BASOPHILS NFR BLD AUTO: 0.7 % — SIGNIFICANT CHANGE UP (ref 0–2)
BILIRUB SERPL-MCNC: 0.5 MG/DL — SIGNIFICANT CHANGE UP (ref 0.2–1.2)
BILIRUB UR-MCNC: NEGATIVE — SIGNIFICANT CHANGE UP
BUN SERPL-MCNC: 19 MG/DL — SIGNIFICANT CHANGE UP (ref 7–23)
CALCIUM SERPL-MCNC: 9.1 MG/DL — SIGNIFICANT CHANGE UP (ref 8.5–10.1)
CHLORIDE SERPL-SCNC: 106 MMOL/L — SIGNIFICANT CHANGE UP (ref 96–108)
CO2 SERPL-SCNC: 26 MMOL/L — SIGNIFICANT CHANGE UP (ref 22–31)
COLOR SPEC: YELLOW — SIGNIFICANT CHANGE UP
CREAT SERPL-MCNC: 1.2 MG/DL — SIGNIFICANT CHANGE UP (ref 0.5–1.3)
DIFF PNL FLD: ABNORMAL
EGFR: 79 ML/MIN/1.73M2 — SIGNIFICANT CHANGE UP
EGFR: 79 ML/MIN/1.73M2 — SIGNIFICANT CHANGE UP
EOSINOPHIL # BLD AUTO: 0.16 K/UL — SIGNIFICANT CHANGE UP (ref 0–0.5)
EOSINOPHIL NFR BLD AUTO: 2.1 % — SIGNIFICANT CHANGE UP (ref 0–6)
GLUCOSE SERPL-MCNC: 146 MG/DL — HIGH (ref 70–99)
GLUCOSE UR QL: NEGATIVE MG/DL — SIGNIFICANT CHANGE UP
HCT VFR BLD CALC: 44 % — SIGNIFICANT CHANGE UP (ref 39–50)
HGB BLD-MCNC: 14.4 G/DL — SIGNIFICANT CHANGE UP (ref 13–17)
IMM GRANULOCYTES NFR BLD AUTO: 0.8 % — SIGNIFICANT CHANGE UP (ref 0–0.9)
KETONES UR-MCNC: NEGATIVE MG/DL — SIGNIFICANT CHANGE UP
LEUKOCYTE ESTERASE UR-ACNC: ABNORMAL
LIDOCAIN IGE QN: 29 U/L — SIGNIFICANT CHANGE UP (ref 13–75)
LYMPHOCYTES # BLD AUTO: 2.15 K/UL — SIGNIFICANT CHANGE UP (ref 1–3.3)
LYMPHOCYTES # BLD AUTO: 28.8 % — SIGNIFICANT CHANGE UP (ref 13–44)
MCHC RBC-ENTMCNC: 27.6 PG — SIGNIFICANT CHANGE UP (ref 27–34)
MCHC RBC-ENTMCNC: 32.7 G/DL — SIGNIFICANT CHANGE UP (ref 32–36)
MCV RBC AUTO: 84.5 FL — SIGNIFICANT CHANGE UP (ref 80–100)
MONOCYTES # BLD AUTO: 0.49 K/UL — SIGNIFICANT CHANGE UP (ref 0–0.9)
MONOCYTES NFR BLD AUTO: 6.6 % — SIGNIFICANT CHANGE UP (ref 2–14)
NEUTROPHILS # BLD AUTO: 4.56 K/UL — SIGNIFICANT CHANGE UP (ref 1.8–7.4)
NEUTROPHILS NFR BLD AUTO: 61 % — SIGNIFICANT CHANGE UP (ref 43–77)
NITRITE UR-MCNC: NEGATIVE — SIGNIFICANT CHANGE UP
NRBC BLD AUTO-RTO: 0 /100 WBCS — SIGNIFICANT CHANGE UP (ref 0–0)
PH UR: 5.5 — SIGNIFICANT CHANGE UP (ref 5–8)
PLATELET # BLD AUTO: 291 K/UL — SIGNIFICANT CHANGE UP (ref 150–400)
POTASSIUM SERPL-MCNC: 4.1 MMOL/L — SIGNIFICANT CHANGE UP (ref 3.5–5.3)
POTASSIUM SERPL-SCNC: 4.1 MMOL/L — SIGNIFICANT CHANGE UP (ref 3.5–5.3)
PROT SERPL-MCNC: 7.8 G/DL — SIGNIFICANT CHANGE UP (ref 6–8.3)
PROT UR-MCNC: NEGATIVE MG/DL — SIGNIFICANT CHANGE UP
RBC # BLD: 5.21 M/UL — SIGNIFICANT CHANGE UP (ref 4.2–5.8)
RBC # FLD: 14.2 % — SIGNIFICANT CHANGE UP (ref 10.3–14.5)
SODIUM SERPL-SCNC: 141 MMOL/L — SIGNIFICANT CHANGE UP (ref 135–145)
SP GR SPEC: 1.02 — SIGNIFICANT CHANGE UP (ref 1–1.03)
UROBILINOGEN FLD QL: 1 MG/DL — SIGNIFICANT CHANGE UP (ref 0.2–1)
WBC # BLD: 7.47 K/UL — SIGNIFICANT CHANGE UP (ref 3.8–10.5)
WBC # FLD AUTO: 7.47 K/UL — SIGNIFICANT CHANGE UP (ref 3.8–10.5)

## 2025-05-09 PROCEDURE — 74176 CT ABD & PELVIS W/O CONTRAST: CPT

## 2025-05-09 PROCEDURE — 83690 ASSAY OF LIPASE: CPT

## 2025-05-09 PROCEDURE — 74176 CT ABD & PELVIS W/O CONTRAST: CPT | Mod: 26

## 2025-05-09 PROCEDURE — 87086 URINE CULTURE/COLONY COUNT: CPT

## 2025-05-09 PROCEDURE — 80053 COMPREHEN METABOLIC PANEL: CPT

## 2025-05-09 PROCEDURE — 96374 THER/PROPH/DIAG INJ IV PUSH: CPT

## 2025-05-09 PROCEDURE — 36415 COLL VENOUS BLD VENIPUNCTURE: CPT

## 2025-05-09 PROCEDURE — 81001 URINALYSIS AUTO W/SCOPE: CPT

## 2025-05-09 PROCEDURE — 99284 EMERGENCY DEPT VISIT MOD MDM: CPT | Mod: 25

## 2025-05-09 PROCEDURE — 85025 COMPLETE CBC W/AUTO DIFF WBC: CPT

## 2025-05-09 RX ORDER — CEFUROXIME SODIUM 1.5 G
1 VIAL (EA) INJECTION
Qty: 14 | Refills: 0
Start: 2025-05-09

## 2025-05-09 RX ORDER — CEFUROXIME SODIUM 1.5 G
500 VIAL (EA) INJECTION ONCE
Refills: 0 | Status: COMPLETED | OUTPATIENT
Start: 2025-05-09 | End: 2025-05-09

## 2025-05-09 RX ORDER — IBUPROFEN 200 MG
1 TABLET ORAL
Qty: 30 | Refills: 0
Start: 2025-05-09

## 2025-05-09 RX ADMIN — Medication 1000 MILLIGRAM(S): at 02:12

## 2025-05-09 RX ADMIN — Medication 400 MILLIGRAM(S): at 00:30

## 2025-05-09 RX ADMIN — Medication 500 MILLIGRAM(S): at 02:09

## 2025-05-09 RX ADMIN — Medication 1000 MILLILITER(S): at 00:28

## 2025-05-09 NOTE — ED ADULT NURSE REASSESSMENT NOTE - NS ED NURSE REASSESS COMMENT FT1
DC instructions reviewed at bedside with pt, pt able to verbalize understanding of information. Medication education provided. All questions answered as asked. Pt denies any additional needs at this time. Pt pending dc home

## 2025-05-09 NOTE — ED ADULT NURSE NOTE - OBJECTIVE STATEMENT
Pt received in room 3A, pt is A+Ox4 and independent with ambulation and ADLs at baseline. Pt is presenting with a chief complaint of R flank pain which pt states began approx 2 hour prior to arrival. At this time pt states pain is subsiding, states it is no longer "sharp and twisting" but describes it as feeling "sore" in the R flank area. Pt provided urine sample, notified RN of small stone present in urine cup. Pt denies cp/sob/palpitations. Pt denies nausea at this time, endorses 1 episode of emesis prior to arrival. Pt denies diarrhea/constipation.

## 2025-05-09 NOTE — ED ADULT NURSE NOTE - NS ED NURSE IV DC DT
Patient with mild leukocytosis on lab work but afebrile  States she has been taking an Macrodantin and Cipro for UTI as prescribed by urology.  Discussed with patient's daughter to verify meds as unclear why she is on 2 different antibiotics and per daughter patient was only supposed to be on Macrodantin for 2 weeks but patient states she is still on it  Asymptomatic, hold off UTI treatment antibiotic therapy   09-May-2025 02:13

## 2025-07-14 ENCOUNTER — EMERGENCY (EMERGENCY)
Facility: HOSPITAL | Age: 40
LOS: 1 days | End: 2025-07-14
Attending: STUDENT IN AN ORGANIZED HEALTH CARE EDUCATION/TRAINING PROGRAM | Admitting: STUDENT IN AN ORGANIZED HEALTH CARE EDUCATION/TRAINING PROGRAM
Payer: COMMERCIAL

## 2025-07-14 VITALS
WEIGHT: 315 LBS | TEMPERATURE: 98 F | DIASTOLIC BLOOD PRESSURE: 84 MMHG | HEIGHT: 72 IN | RESPIRATION RATE: 20 BRPM | OXYGEN SATURATION: 96 % | SYSTOLIC BLOOD PRESSURE: 131 MMHG | HEART RATE: 86 BPM

## 2025-07-14 VITALS
TEMPERATURE: 98 F | RESPIRATION RATE: 18 BRPM | OXYGEN SATURATION: 99 % | HEART RATE: 69 BPM | SYSTOLIC BLOOD PRESSURE: 158 MMHG | DIASTOLIC BLOOD PRESSURE: 97 MMHG

## 2025-07-14 LAB
ALBUMIN SERPL ELPH-MCNC: 3.3 G/DL — SIGNIFICANT CHANGE UP (ref 3.3–5)
ALP SERPL-CCNC: 77 U/L — SIGNIFICANT CHANGE UP (ref 40–120)
ALT FLD-CCNC: 36 U/L — SIGNIFICANT CHANGE UP (ref 12–78)
ANION GAP SERPL CALC-SCNC: 6 MMOL/L — SIGNIFICANT CHANGE UP (ref 5–17)
APPEARANCE UR: ABNORMAL
AST SERPL-CCNC: 18 U/L — SIGNIFICANT CHANGE UP (ref 15–37)
BILIRUB SERPL-MCNC: 0.3 MG/DL — SIGNIFICANT CHANGE UP (ref 0.2–1.2)
BILIRUB UR-MCNC: NEGATIVE — SIGNIFICANT CHANGE UP
BUN SERPL-MCNC: 18 MG/DL — SIGNIFICANT CHANGE UP (ref 7–23)
CALCIUM SERPL-MCNC: 8.9 MG/DL — SIGNIFICANT CHANGE UP (ref 8.5–10.1)
CHLORIDE SERPL-SCNC: 110 MMOL/L — HIGH (ref 96–108)
CO2 SERPL-SCNC: 25 MMOL/L — SIGNIFICANT CHANGE UP (ref 22–31)
COLOR SPEC: ABNORMAL
CREAT SERPL-MCNC: 1.1 MG/DL — SIGNIFICANT CHANGE UP (ref 0.5–1.3)
DIFF PNL FLD: ABNORMAL
EGFR: 88 ML/MIN/1.73M2 — SIGNIFICANT CHANGE UP
EGFR: 88 ML/MIN/1.73M2 — SIGNIFICANT CHANGE UP
GLUCOSE SERPL-MCNC: 110 MG/DL — HIGH (ref 70–99)
GLUCOSE UR QL: NEGATIVE MG/DL — SIGNIFICANT CHANGE UP
HCT VFR BLD CALC: 42.9 % — SIGNIFICANT CHANGE UP (ref 39–50)
HGB BLD-MCNC: 14.2 G/DL — SIGNIFICANT CHANGE UP (ref 13–17)
KETONES UR QL: NEGATIVE MG/DL — SIGNIFICANT CHANGE UP
LEUKOCYTE ESTERASE UR-ACNC: ABNORMAL
LIDOCAIN IGE QN: 29 U/L — SIGNIFICANT CHANGE UP (ref 13–75)
MCHC RBC-ENTMCNC: 27.8 PG — SIGNIFICANT CHANGE UP (ref 27–34)
MCHC RBC-ENTMCNC: 33.1 G/DL — SIGNIFICANT CHANGE UP (ref 32–36)
MCV RBC AUTO: 84.1 FL — SIGNIFICANT CHANGE UP (ref 80–100)
NITRITE UR-MCNC: NEGATIVE — SIGNIFICANT CHANGE UP
NRBC # BLD AUTO: 0 K/UL — SIGNIFICANT CHANGE UP (ref 0–0)
NRBC # FLD: 0 K/UL — SIGNIFICANT CHANGE UP (ref 0–0)
NRBC BLD AUTO-RTO: 0 /100 WBCS — SIGNIFICANT CHANGE UP (ref 0–0)
PH UR: 5 — SIGNIFICANT CHANGE UP (ref 5–8)
PLATELET # BLD AUTO: 297 K/UL — SIGNIFICANT CHANGE UP (ref 150–400)
PMV BLD: 9.1 FL — SIGNIFICANT CHANGE UP (ref 7–13)
POTASSIUM SERPL-MCNC: 3.8 MMOL/L — SIGNIFICANT CHANGE UP (ref 3.5–5.3)
POTASSIUM SERPL-SCNC: 3.8 MMOL/L — SIGNIFICANT CHANGE UP (ref 3.5–5.3)
PROT SERPL-MCNC: 7.8 G/DL — SIGNIFICANT CHANGE UP (ref 6–8.3)
PROT UR-MCNC: 100 MG/DL
RBC # BLD: 5.1 M/UL — SIGNIFICANT CHANGE UP (ref 4.2–5.8)
RBC # FLD: 13.2 % — SIGNIFICANT CHANGE UP (ref 10.3–14.5)
SODIUM SERPL-SCNC: 141 MMOL/L — SIGNIFICANT CHANGE UP (ref 135–145)
SP GR SPEC: 1.02 — SIGNIFICANT CHANGE UP (ref 1–1.03)
UROBILINOGEN FLD QL: 0.2 MG/DL — SIGNIFICANT CHANGE UP (ref 0.2–1)
WBC # BLD: 6.22 K/UL — SIGNIFICANT CHANGE UP (ref 3.8–10.5)
WBC # FLD AUTO: 6.22 K/UL — SIGNIFICANT CHANGE UP (ref 3.8–10.5)

## 2025-07-14 PROCEDURE — 81001 URINALYSIS AUTO W/SCOPE: CPT

## 2025-07-14 PROCEDURE — 99284 EMERGENCY DEPT VISIT MOD MDM: CPT | Mod: 25

## 2025-07-14 PROCEDURE — 74176 CT ABD & PELVIS W/O CONTRAST: CPT | Mod: 26

## 2025-07-14 PROCEDURE — 96376 TX/PRO/DX INJ SAME DRUG ADON: CPT

## 2025-07-14 PROCEDURE — 99285 EMERGENCY DEPT VISIT HI MDM: CPT

## 2025-07-14 PROCEDURE — 74176 CT ABD & PELVIS W/O CONTRAST: CPT

## 2025-07-14 PROCEDURE — 85027 COMPLETE CBC AUTOMATED: CPT

## 2025-07-14 PROCEDURE — 87086 URINE CULTURE/COLONY COUNT: CPT

## 2025-07-14 PROCEDURE — 96375 TX/PRO/DX INJ NEW DRUG ADDON: CPT

## 2025-07-14 PROCEDURE — 83690 ASSAY OF LIPASE: CPT

## 2025-07-14 PROCEDURE — 96374 THER/PROPH/DIAG INJ IV PUSH: CPT

## 2025-07-14 PROCEDURE — 96361 HYDRATE IV INFUSION ADD-ON: CPT

## 2025-07-14 PROCEDURE — 36415 COLL VENOUS BLD VENIPUNCTURE: CPT

## 2025-07-14 PROCEDURE — 80053 COMPREHEN METABOLIC PANEL: CPT

## 2025-07-14 RX ORDER — HYDROMORPHONE/SOD CHLOR,ISO/PF 2 MG/10 ML
0.5 SYRINGE (ML) INJECTION ONCE
Refills: 0 | Status: DISCONTINUED | OUTPATIENT
Start: 2025-07-14 | End: 2025-07-14

## 2025-07-14 RX ORDER — CEFUROXIME SODIUM 1.5 G
1 VIAL (EA) INJECTION
Qty: 14 | Refills: 0
Start: 2025-07-14 | End: 2025-07-20

## 2025-07-14 RX ORDER — TAMSULOSIN HYDROCHLORIDE 0.4 MG/1
1 CAPSULE ORAL
Qty: 7 | Refills: 0
Start: 2025-07-14 | End: 2025-07-20

## 2025-07-14 RX ORDER — ONDANSETRON HCL/PF 4 MG/2 ML
4 VIAL (ML) INJECTION ONCE
Refills: 0 | Status: COMPLETED | OUTPATIENT
Start: 2025-07-14 | End: 2025-07-14

## 2025-07-14 RX ORDER — ONDANSETRON HCL/PF 4 MG/2 ML
1 VIAL (ML) INJECTION
Qty: 1 | Refills: 0
Start: 2025-07-14 | End: 2025-07-16

## 2025-07-14 RX ORDER — OXYCODONE HYDROCHLORIDE AND ACETAMINOPHEN 10; 325 MG/1; MG/1
1 TABLET ORAL
Qty: 12 | Refills: 0
Start: 2025-07-14 | End: 2025-07-16

## 2025-07-14 RX ORDER — KETOROLAC TROMETHAMINE 30 MG/ML
15 INJECTION, SOLUTION INTRAMUSCULAR; INTRAVENOUS ONCE
Refills: 0 | Status: DISCONTINUED | OUTPATIENT
Start: 2025-07-14 | End: 2025-07-14

## 2025-07-14 RX ADMIN — Medication 4 MILLIGRAM(S): at 13:19

## 2025-07-14 RX ADMIN — KETOROLAC TROMETHAMINE 15 MILLIGRAM(S): 30 INJECTION, SOLUTION INTRAMUSCULAR; INTRAVENOUS at 16:00

## 2025-07-14 RX ADMIN — Medication 1000 MILLILITER(S): at 12:49

## 2025-07-14 RX ADMIN — Medication 4 MILLIGRAM(S): at 14:26

## 2025-07-14 RX ADMIN — KETOROLAC TROMETHAMINE 15 MILLIGRAM(S): 30 INJECTION, SOLUTION INTRAMUSCULAR; INTRAVENOUS at 15:30

## 2025-07-14 RX ADMIN — Medication 1000 MILLILITER(S): at 13:49

## 2025-07-14 RX ADMIN — Medication 0.5 MILLIGRAM(S): at 14:26

## 2025-07-14 RX ADMIN — Medication 0.5 MILLIGRAM(S): at 14:56

## 2025-07-14 RX ADMIN — Medication 4 MILLIGRAM(S): at 12:49

## 2025-07-14 NOTE — ED PROVIDER NOTE - PATIENT PORTAL LINK FT
You can access the FollowMyHealth Patient Portal offered by [facility] by registering at the following website: [email or web address]. By joining [employer]’s FollowMyHealth portal, you will also be able to view your health information using other applications (apps) compatible with our system.

## 2025-07-14 NOTE — ED PROVIDER NOTE - CLINICAL SUMMARY MEDICAL DECISION MAKING FREE TEXT BOX
34-year-old male with past medical history of nephrolithiasis, presents to the emergency department for sudden onset right flank pain that started today with associated nausea and vomiting.  Has not taken anything for pain.  Pain is similar to prior symptoms from when he had nephrolithiasis. Denies fever, chills, chest pain, shortness of breath, abdominal pain, dysuria, hematuria, testicular pain or swelling.    Physical Exam:  Gen: moderate acute distress, awake and alert, non-toxic appearing  HEENT: Atraumatic, oropharynx clear, moist mucous membranes, normal conjunctiva  Cardio: RRR, no murmurs, rubs or gallops  Lung: CTAB, no respiratory distress, no wheezes/rhonchi/rales B/L  Abd: soft, NT, ND, no guarding, no rigidity, no rebound tenderness. right CVAT  MSK: no visible deformities, ROMx4   Neuro: No focal sensory or motor deficits    Presentation most consistent with renal colic from kidney stone.  Also considering UTI, infected stone. Given History and Exam I have lower suspicion for atypical appendicitis, genital torsion, acute cholecystitis, AAA, Aortic Dissection, Serious Bacterial Illness or other emergent intraabdominal pathology.  CBC, BMP, CT Abd/Pelvis noncontrast, UA, reassess

## 2025-07-14 NOTE — ED ADULT TRIAGE NOTE - CHIEF COMPLAINT QUOTE
I have right kidney pain.  I just had a stone, I think I have another.  pain was sudden onset.  no issue voiding.  I threw up in parking lot and think I may again.

## 2025-07-14 NOTE — ED PROVIDER NOTE - CARE PROVIDER_API CALL
Dunia Coats [facility]  Urology  [address] [place] [address]  Phone: [phone]  Fax: [phone]  Follow Up Time: 1-3 Days

## 2025-07-14 NOTE — ED PROVIDER NOTE - NSFOLLOWUPINSTRUCTIONS_ED_ALL_ED_FT
Kidney Stones    Kidney stones (urolithiasis) are crystal deposits that form inside your kidneys. Pain is caused by the stone moving through the urinary tract, causing spasms of the ureter. Drink enough water and fluids to keep your urine clear or pale yellow. This will help you to pass the stone or stone fragments. If provided a strainer, strain all urine and keep all particulate matter and stones for a follow up appointment with a urologist.    SEEK IMMEDIATE MEDICAL CARE IF YOU HAVE ANY OF THE FOLLOWING SYMPTOMS: pain not controlled with medication, fever/chills, worsening vomiting, inability to urinate, or dizziness/lightheadedness.     1. Take 1 tab of percocet as needed for pain every 4-6 hours  2. Take motrin 600 mg every 6-8 hours as needed for pain  3. Take ceftin (antibiotic) twice as day as prescribed  4. Take zofran as needed for nausea and vomiting  5. Take flomax once a day as prescribed  6, Follow up with outpatient urologist and return to ED with any new or worsening symptoms including fever and chills.

## 2025-07-14 NOTE — ED PROVIDER NOTE - PROGRESS NOTE DETAILS
ASIA Del Valle:   40 yo male with h/o kidney stones presents to the ED c/o right flank pain since this morning. Associated with nausea and vomiting. Pain feels similar to prior kidney stone. Denies fever, chills, abd pain, hematuria, dysuria. PE: + right CVA tenderness. abdomen otherwise soft non-tender. A/P: Labs, UA, CT renal hunt, pain meds, reassess. Reevaluated patient at bedside.  Patient feeling much improved.  Discussed the results of all diagnostic testing in ED and copies of all available reports given.   An opportunity to ask questions was provided.  Discussed the importance of prompt, close medical follow-up.  Patient will return with any changes, concerns or persistent/worsening symptoms.  Understanding of all instructions verbalized. Will dc with flomax, ceftin and percocet. Recommend close urology follow up and return to ED with fever, chills and/or worsening symptoms.